# Patient Record
Sex: MALE | Race: WHITE | Employment: OTHER | URBAN - METROPOLITAN AREA
[De-identification: names, ages, dates, MRNs, and addresses within clinical notes are randomized per-mention and may not be internally consistent; named-entity substitution may affect disease eponyms.]

---

## 2017-05-08 ENCOUNTER — OFFICE VISIT (OUTPATIENT)
Dept: FAMILY MEDICINE CLINIC | Age: 82
End: 2017-05-08

## 2017-05-08 VITALS
DIASTOLIC BLOOD PRESSURE: 70 MMHG | SYSTOLIC BLOOD PRESSURE: 128 MMHG | BODY MASS INDEX: 34.24 KG/M2 | WEIGHT: 249 LBS | HEART RATE: 56 BPM

## 2017-05-08 DIAGNOSIS — I10 ESSENTIAL HYPERTENSION: Primary | ICD-10-CM

## 2017-05-08 DIAGNOSIS — E78.2 MIXED HYPERLIPIDEMIA: ICD-10-CM

## 2017-05-08 DIAGNOSIS — L30.9 DERMATITIS: ICD-10-CM

## 2017-05-08 PROCEDURE — 4040F PNEUMOC VAC/ADMIN/RCVD: CPT | Performed by: FAMILY MEDICINE

## 2017-05-08 PROCEDURE — G8419 CALC BMI OUT NRM PARAM NOF/U: HCPCS | Performed by: FAMILY MEDICINE

## 2017-05-08 PROCEDURE — 99213 OFFICE O/P EST LOW 20 MIN: CPT | Performed by: FAMILY MEDICINE

## 2017-05-08 PROCEDURE — 1123F ACP DISCUSS/DSCN MKR DOCD: CPT | Performed by: FAMILY MEDICINE

## 2017-05-08 PROCEDURE — 1036F TOBACCO NON-USER: CPT | Performed by: FAMILY MEDICINE

## 2017-05-08 PROCEDURE — G8427 DOCREV CUR MEDS BY ELIG CLIN: HCPCS | Performed by: FAMILY MEDICINE

## 2017-05-08 RX ORDER — LISINOPRIL 10 MG/1
10 TABLET ORAL 2 TIMES DAILY
Qty: 180 TABLET | Refills: 1 | Status: SHIPPED | OUTPATIENT
Start: 2017-05-08 | End: 2018-01-10 | Stop reason: SDUPTHER

## 2017-05-15 ENCOUNTER — HOSPITAL ENCOUNTER (OUTPATIENT)
Dept: WOUND CARE | Age: 82
Discharge: HOME | End: 2017-05-15
Attending: SPECIALIST | Admitting: SPECIALIST

## 2017-05-15 VITALS
WEIGHT: 249 LBS | SYSTOLIC BLOOD PRESSURE: 160 MMHG | BODY MASS INDEX: 33.72 KG/M2 | HEIGHT: 72 IN | TEMPERATURE: 98.6 F | RESPIRATION RATE: 16 BRPM | DIASTOLIC BLOOD PRESSURE: 84 MMHG | HEART RATE: 56 BPM

## 2017-05-15 DIAGNOSIS — I73.9 PERIPHERAL VASCULAR DISEASE, UNSPECIFIED (HCC): Primary | ICD-10-CM

## 2017-05-15 DIAGNOSIS — I87.2 CHRONIC VENOUS INSUFFICIENCY: ICD-10-CM

## 2017-05-15 DIAGNOSIS — I73.9 ARTERIAL INSUFFICIENCY OF LOWER EXTREMITY (HCC): ICD-10-CM

## 2017-05-15 PROCEDURE — 99203 OFFICE O/P NEW LOW 30 MIN: CPT | Performed by: SPECIALIST

## 2017-05-16 ENCOUNTER — HOSPITAL ENCOUNTER (OUTPATIENT)
Dept: VASCULAR LAB | Age: 82
Discharge: HOME | End: 2017-05-16
Attending: SPECIALIST | Admitting: SPECIALIST

## 2017-05-16 DIAGNOSIS — I73.9 PERIPHERAL VASCULAR DISEASE (HCC): ICD-10-CM

## 2017-05-18 ENCOUNTER — HOSPITAL ENCOUNTER (OUTPATIENT)
Dept: VASCULAR LAB | Age: 82
Discharge: HOME | End: 2017-05-18
Attending: SPECIALIST | Admitting: SPECIALIST

## 2017-05-18 DIAGNOSIS — I73.9 PERIPHERAL VASCULAR DISEASE (HCC): ICD-10-CM

## 2017-05-22 ENCOUNTER — HOSPITAL ENCOUNTER (OUTPATIENT)
Dept: WOUND CARE | Age: 82
Discharge: HOME | End: 2017-05-22
Attending: SPECIALIST | Admitting: SPECIALIST

## 2017-05-22 VITALS — RESPIRATION RATE: 16 BRPM | DIASTOLIC BLOOD PRESSURE: 78 MMHG | SYSTOLIC BLOOD PRESSURE: 175 MMHG | TEMPERATURE: 98.2 F

## 2017-05-22 DIAGNOSIS — I73.9 PERIPHERAL VASCULAR DISEASE, UNSPECIFIED (HCC): Primary | ICD-10-CM

## 2017-05-22 DIAGNOSIS — I87.2 CHRONIC VENOUS INSUFFICIENCY: ICD-10-CM

## 2017-05-22 PROCEDURE — 29581 APPL MULTLAYER CMPRN SYS LEG: CPT | Performed by: SPECIALIST

## 2017-05-26 ENCOUNTER — HOSPITAL ENCOUNTER (OUTPATIENT)
Dept: WOUND CARE | Age: 82
Discharge: HOME | End: 2017-05-26
Attending: SPECIALIST | Admitting: SPECIALIST

## 2017-05-26 VITALS
RESPIRATION RATE: 16 BRPM | SYSTOLIC BLOOD PRESSURE: 190 MMHG | TEMPERATURE: 97.7 F | DIASTOLIC BLOOD PRESSURE: 79 MMHG | HEART RATE: 58 BPM

## 2017-05-31 ENCOUNTER — HOSPITAL ENCOUNTER (OUTPATIENT)
Dept: WOUND CARE | Age: 82
Discharge: HOME | End: 2017-05-31
Attending: SPECIALIST | Admitting: SPECIALIST

## 2017-05-31 VITALS
TEMPERATURE: 98.2 F | DIASTOLIC BLOOD PRESSURE: 78 MMHG | HEART RATE: 55 BPM | SYSTOLIC BLOOD PRESSURE: 188 MMHG | RESPIRATION RATE: 18 BRPM

## 2017-05-31 DIAGNOSIS — I73.9 ARTERIAL INSUFFICIENCY OF LOWER EXTREMITY (HCC): ICD-10-CM

## 2017-05-31 DIAGNOSIS — I87.2 CHRONIC VENOUS INSUFFICIENCY: Primary | ICD-10-CM

## 2017-05-31 DIAGNOSIS — I73.9 PERIPHERAL VASCULAR DISEASE, UNSPECIFIED (HCC): ICD-10-CM

## 2017-05-31 PROCEDURE — 99212 OFFICE O/P EST SF 10 MIN: CPT | Performed by: SPECIALIST

## 2017-06-07 ENCOUNTER — HOSPITAL ENCOUNTER (OUTPATIENT)
Dept: WOUND CARE | Age: 82
Discharge: HOME | End: 2017-06-07
Attending: SPECIALIST | Admitting: SPECIALIST

## 2017-06-07 VITALS
HEART RATE: 68 BPM | DIASTOLIC BLOOD PRESSURE: 80 MMHG | RESPIRATION RATE: 18 BRPM | TEMPERATURE: 97.7 F | SYSTOLIC BLOOD PRESSURE: 183 MMHG

## 2017-06-07 DIAGNOSIS — I73.9 PERIPHERAL VASCULAR DISEASE, UNSPECIFIED (HCC): Primary | ICD-10-CM

## 2017-06-07 DIAGNOSIS — I87.2 CHRONIC VENOUS INSUFFICIENCY: ICD-10-CM

## 2017-06-07 PROCEDURE — 99212 OFFICE O/P EST SF 10 MIN: CPT | Performed by: SPECIALIST

## 2017-10-23 ENCOUNTER — NURSE ONLY (OUTPATIENT)
Dept: FAMILY MEDICINE CLINIC | Age: 82
End: 2017-10-23

## 2017-10-23 DIAGNOSIS — Z23 NEEDS FLU SHOT: Primary | ICD-10-CM

## 2017-10-23 PROCEDURE — 90662 IIV NO PRSV INCREASED AG IM: CPT | Performed by: FAMILY MEDICINE

## 2017-10-23 PROCEDURE — G0008 ADMIN INFLUENZA VIRUS VAC: HCPCS | Performed by: FAMILY MEDICINE

## 2017-11-09 ENCOUNTER — OFFICE VISIT (OUTPATIENT)
Dept: FAMILY MEDICINE CLINIC | Age: 82
End: 2017-11-09

## 2017-11-09 VITALS
OXYGEN SATURATION: 98 % | WEIGHT: 249 LBS | DIASTOLIC BLOOD PRESSURE: 76 MMHG | HEIGHT: 71 IN | HEART RATE: 52 BPM | SYSTOLIC BLOOD PRESSURE: 132 MMHG | BODY MASS INDEX: 34.86 KG/M2 | RESPIRATION RATE: 16 BRPM

## 2017-11-09 DIAGNOSIS — I87.2 CHRONIC VENOUS INSUFFICIENCY: ICD-10-CM

## 2017-11-09 DIAGNOSIS — I73.9 PERIPHERAL VASCULAR DISEASE (HCC): ICD-10-CM

## 2017-11-09 DIAGNOSIS — I10 ESSENTIAL HYPERTENSION: Primary | ICD-10-CM

## 2017-11-09 DIAGNOSIS — E78.2 MIXED HYPERLIPIDEMIA: ICD-10-CM

## 2017-11-09 PROCEDURE — G8417 CALC BMI ABV UP PARAM F/U: HCPCS | Performed by: FAMILY MEDICINE

## 2017-11-09 PROCEDURE — G8484 FLU IMMUNIZE NO ADMIN: HCPCS | Performed by: FAMILY MEDICINE

## 2017-11-09 PROCEDURE — 4040F PNEUMOC VAC/ADMIN/RCVD: CPT | Performed by: FAMILY MEDICINE

## 2017-11-09 PROCEDURE — G8427 DOCREV CUR MEDS BY ELIG CLIN: HCPCS | Performed by: FAMILY MEDICINE

## 2017-11-09 PROCEDURE — 1123F ACP DISCUSS/DSCN MKR DOCD: CPT | Performed by: FAMILY MEDICINE

## 2017-11-09 PROCEDURE — 99213 OFFICE O/P EST LOW 20 MIN: CPT | Performed by: FAMILY MEDICINE

## 2017-11-09 PROCEDURE — 1036F TOBACCO NON-USER: CPT | Performed by: FAMILY MEDICINE

## 2017-11-09 ASSESSMENT — PATIENT HEALTH QUESTIONNAIRE - PHQ9
2. FEELING DOWN, DEPRESSED OR HOPELESS: 0
1. LITTLE INTEREST OR PLEASURE IN DOING THINGS: 0
SUM OF ALL RESPONSES TO PHQ QUESTIONS 1-9: 0
SUM OF ALL RESPONSES TO PHQ9 QUESTIONS 1 & 2: 0

## 2017-11-09 NOTE — PROGRESS NOTES
Subjective:      Patient ID: Kishor Quesada is a 80 y.o. male. HPI  Hypertension: Patient here for follow-up of elevated blood pressure. He is not exercising and is adherent to low salt diet. Blood pressure is well controlled at home. Cardiac symptoms none. Patient denies chest pain, chest pressure/discomfort, claudication, dyspnea, exertional chest pressure/discomfort, fatigue, irregular heart beat, lower extremity edema, near-syncope, orthopnea, palpitations, paroxysmal nocturnal dyspnea, syncope and tachypnea. Cardiovascular risk factors: advanced age (older than 54 for men, 72 for women), hypertension, male gender, obesity (BMI >= 30 kg/m2) and sedentary lifestyle. Use of agents associated with hypertension: none. History of target organ damage: none. Pt with history of polio on the right was seen by wound care clinic, his leg is a lot better now, he is dicharged from their care to continue using comporession hoses, using cream was given doing better no concern    Review of Systems   Constitutional: Negative. HENT: Negative. Eyes: Negative. Respiratory: Negative. Cardiovascular: Negative. Gastrointestinal: Negative. All other systems reviewed and are negative. Objective:   Physical Exam   Vitals reviewed. Constitutional: He is oriented to person, place, and time. He appears well-developed and well-nourished. HENT:   Mouth/Throat: Oropharynx is clear and moist.   Eyes: Conjunctivae are normal. No scleral icterus. Neck: Normal range of motion. Neck supple. No JVD present. Carotid bruit is not present. No thyromegaly present. Cardiovascular: Normal rate, regular rhythm, normal heart sounds and intact distal pulses. No murmur heard. Pulmonary/Chest: Effort normal and breath sounds normal. No respiratory distress. He has no wheezes. He has no rales. He exhibits no tenderness. Abdominal: Soft. Bowel sounds are normal. He exhibits no distension and no mass.  There is no

## 2017-11-10 ENCOUNTER — HOSPITAL ENCOUNTER (OUTPATIENT)
Dept: GENERAL RADIOLOGY | Age: 82
Discharge: HOME | End: 2017-11-10

## 2017-11-10 LAB
ALBUMIN SERPL-MCNC: 4 G/DL (ref 3.4–5)
ALP BLD-CCNC: 80 U/L (ref 40–129)
ALT SERPL-CCNC: 23 U/L (ref 10–40)
ANION GAP SERPL CALCULATED.3IONS-SCNC: 16 MMOL/L (ref 3–16)
AST SERPL-CCNC: 26 U/L (ref 15–37)
BILIRUB SERPL-MCNC: 1.2 MG/DL (ref 0–1)
BILIRUBIN DIRECT: 0.3 MG/DL (ref 0–0.3)
BILIRUBIN, INDIRECT: 0.9 MG/DL (ref 0–1)
BUN BLDV-MCNC: 16 MG/DL (ref 7–20)
CALCIUM SERPL-MCNC: 9.3 MG/DL (ref 8.3–10.6)
CHLORIDE BLD-SCNC: 104 MMOL/L (ref 99–110)
CHOLESTEROL, FASTING: 177 MG/DL (ref 0–199)
CO2: 23 MMOL/L (ref 21–32)
CREAT SERPL-MCNC: 0.8 MG/DL (ref 0.8–1.3)
GFR AFRICAN AMERICAN: >60
GFR NON-AFRICAN AMERICAN: >60
GLUCOSE FASTING: 84 MG/DL (ref 70–99)
HCT VFR BLD CALC: 51.7 % (ref 40.5–52.5)
HDLC SERPL-MCNC: 35 MG/DL (ref 40–60)
HEMOGLOBIN: 17.6 G/DL (ref 13.5–17.5)
LDL CHOLESTEROL CALCULATED: 110 MG/DL
MCH RBC QN AUTO: 31.4 PG (ref 26–34)
MCHC RBC AUTO-ENTMCNC: 34 G/DL (ref 31–36)
MCV RBC AUTO: 92.4 FL (ref 80–100)
PDW BLD-RTO: 13.4 % (ref 12.4–15.4)
PLATELET # BLD: 135 K/UL (ref 135–450)
PMV BLD AUTO: 8.7 FL (ref 5–10.5)
POTASSIUM SERPL-SCNC: 4.4 MMOL/L (ref 3.5–5.1)
RBC # BLD: 5.59 M/UL (ref 4.2–5.9)
SODIUM BLD-SCNC: 143 MMOL/L (ref 136–145)
TOTAL PROTEIN: 6.2 G/DL (ref 6.4–8.2)
TRIGLYCERIDE, FASTING: 160 MG/DL (ref 0–150)
TSH SERPL DL<=0.05 MIU/L-ACNC: 2.88 UIU/ML (ref 0.27–4.2)
VLDLC SERPL CALC-MCNC: 32 MG/DL
WBC # BLD: 5.8 K/UL (ref 4–11)

## 2018-01-10 DIAGNOSIS — I10 ESSENTIAL HYPERTENSION: ICD-10-CM

## 2018-01-10 RX ORDER — LISINOPRIL 10 MG/1
10 TABLET ORAL 2 TIMES DAILY
Qty: 180 TABLET | Refills: 1 | Status: SHIPPED | OUTPATIENT
Start: 2018-01-10 | End: 2020-01-01 | Stop reason: SDUPTHER

## 2018-12-21 ENCOUNTER — NURSE ONLY (OUTPATIENT)
Dept: FAMILY MEDICINE CLINIC | Age: 83
End: 2018-12-21
Payer: MEDICARE

## 2018-12-21 DIAGNOSIS — Z23 NEED FOR INFLUENZA VACCINATION: Primary | ICD-10-CM

## 2018-12-21 PROCEDURE — 90662 IIV NO PRSV INCREASED AG IM: CPT | Performed by: FAMILY MEDICINE

## 2018-12-21 PROCEDURE — G0008 ADMIN INFLUENZA VIRUS VAC: HCPCS | Performed by: FAMILY MEDICINE

## 2019-10-11 ENCOUNTER — NURSE ONLY (OUTPATIENT)
Dept: PRIMARY CARE CLINIC | Age: 84
End: 2019-10-11
Payer: MEDICARE

## 2019-10-11 PROCEDURE — 90653 IIV ADJUVANT VACCINE IM: CPT | Performed by: FAMILY MEDICINE

## 2019-10-11 PROCEDURE — G0008 ADMIN INFLUENZA VIRUS VAC: HCPCS | Performed by: FAMILY MEDICINE

## 2020-01-01 ENCOUNTER — OFFICE VISIT (OUTPATIENT)
Dept: PRIMARY CARE CLINIC | Age: 85
End: 2020-01-01
Payer: MEDICARE

## 2020-01-01 ENCOUNTER — HOSPITAL ENCOUNTER (OUTPATIENT)
Age: 85
Discharge: HOME | End: 2020-10-12
Payer: MEDICARE

## 2020-01-01 ENCOUNTER — HOSPITAL ENCOUNTER (OUTPATIENT)
Dept: WOUND CARE | Age: 85
Discharge: HOME | End: 2020-10-19
Payer: MEDICARE

## 2020-01-01 ENCOUNTER — HOSPITAL ENCOUNTER (OUTPATIENT)
Dept: WOUND CARE | Age: 85
Discharge: HOME | End: 2020-10-26
Payer: MEDICARE

## 2020-01-01 ENCOUNTER — NURSE ONLY (OUTPATIENT)
Dept: PRIMARY CARE CLINIC | Age: 85
End: 2020-01-01
Payer: MEDICARE

## 2020-01-01 VITALS
TEMPERATURE: 99.7 F | HEART RATE: 87 BPM | DIASTOLIC BLOOD PRESSURE: 80 MMHG | SYSTOLIC BLOOD PRESSURE: 160 MMHG | WEIGHT: 224.4 LBS | RESPIRATION RATE: 14 BRPM | OXYGEN SATURATION: 98 % | BODY MASS INDEX: 31.3 KG/M2

## 2020-01-01 VITALS
BODY MASS INDEX: 31.46 KG/M2 | DIASTOLIC BLOOD PRESSURE: 84 MMHG | WEIGHT: 225.6 LBS | HEART RATE: 84 BPM | OXYGEN SATURATION: 98 % | TEMPERATURE: 97 F | SYSTOLIC BLOOD PRESSURE: 152 MMHG | RESPIRATION RATE: 14 BRPM

## 2020-01-01 VITALS
HEIGHT: 71 IN | TEMPERATURE: 97.7 F | BODY MASS INDEX: 32.31 KG/M2 | WEIGHT: 230.82 LBS | RESPIRATION RATE: 16 BRPM | HEART RATE: 73 BPM | DIASTOLIC BLOOD PRESSURE: 88 MMHG | SYSTOLIC BLOOD PRESSURE: 148 MMHG

## 2020-01-01 VITALS
DIASTOLIC BLOOD PRESSURE: 64 MMHG | BODY MASS INDEX: 33.22 KG/M2 | HEART RATE: 55 BPM | OXYGEN SATURATION: 98 % | TEMPERATURE: 99.3 F | SYSTOLIC BLOOD PRESSURE: 154 MMHG | RESPIRATION RATE: 14 BRPM | WEIGHT: 238.2 LBS

## 2020-01-01 VITALS
DIASTOLIC BLOOD PRESSURE: 65 MMHG | SYSTOLIC BLOOD PRESSURE: 170 MMHG | TEMPERATURE: 97.3 F | HEART RATE: 54 BPM | RESPIRATION RATE: 16 BRPM

## 2020-01-01 DIAGNOSIS — I10 ESSENTIAL HYPERTENSION: ICD-10-CM

## 2020-01-01 LAB
ALBUMIN SERPL-MCNC: 3.8 G/DL (ref 3.4–5)
ALP BLD-CCNC: 103 U/L (ref 40–129)
ALT SERPL-CCNC: 17 U/L (ref 10–40)
ANION GAP SERPL CALCULATED.3IONS-SCNC: 13 MMOL/L (ref 3–16)
ANION GAP SERPL CALCULATED.3IONS-SCNC: 14 MMOL/L (ref 3–16)
ANION GAP SERPL CALCULATED.3IONS-SCNC: 15 MMOL/L (ref 3–16)
AST SERPL-CCNC: 27 U/L (ref 15–37)
BILIRUB SERPL-MCNC: 1.5 MG/DL (ref 0–1)
BILIRUBIN DIRECT: 0.4 MG/DL (ref 0–0.3)
BILIRUBIN, INDIRECT: 1.1 MG/DL (ref 0–1)
BUN BLDV-MCNC: 10 MG/DL (ref 7–20)
BUN BLDV-MCNC: 15 MG/DL (ref 7–20)
BUN BLDV-MCNC: 19 MG/DL (ref 7–20)
CALCIUM SERPL-MCNC: 9.3 MG/DL (ref 8.3–10.6)
CALCIUM SERPL-MCNC: 9.4 MG/DL (ref 8.3–10.6)
CALCIUM SERPL-MCNC: 9.6 MG/DL (ref 8.3–10.6)
CHLORIDE BLD-SCNC: 105 MMOL/L (ref 99–110)
CHLORIDE BLD-SCNC: 106 MMOL/L (ref 99–110)
CHLORIDE BLD-SCNC: 107 MMOL/L (ref 99–110)
CHOLESTEROL, TOTAL: 154 MG/DL (ref 0–199)
CO2: 23 MMOL/L (ref 21–32)
CO2: 25 MMOL/L (ref 21–32)
CO2: 26 MMOL/L (ref 21–32)
CREAT SERPL-MCNC: 0.8 MG/DL (ref 0.8–1.3)
CREAT SERPL-MCNC: 0.8 MG/DL (ref 0.8–1.3)
CREAT SERPL-MCNC: 0.9 MG/DL (ref 0.8–1.3)
GFR AFRICAN AMERICAN: >60
GFR NON-AFRICAN AMERICAN: >60
GLUCOSE BLD-MCNC: 147 MG/DL (ref 70–99)
GLUCOSE BLD-MCNC: 62 MG/DL (ref 70–99)
GLUCOSE BLD-MCNC: 93 MG/DL (ref 70–99)
HCT VFR BLD CALC: 47.8 % (ref 40.5–52.5)
HCT VFR BLD CALC: 48.6 % (ref 40.5–52.5)
HDLC SERPL-MCNC: 38 MG/DL (ref 40–60)
HEMOGLOBIN: 15.9 G/DL (ref 13.5–17.5)
HEMOGLOBIN: 16.2 G/DL (ref 13.5–17.5)
LDL CHOLESTEROL CALCULATED: 93 MG/DL
MCH RBC QN AUTO: 30.6 PG (ref 26–34)
MCH RBC QN AUTO: 30.8 PG (ref 26–34)
MCHC RBC AUTO-ENTMCNC: 33.2 G/DL (ref 31–36)
MCHC RBC AUTO-ENTMCNC: 33.4 G/DL (ref 31–36)
MCV RBC AUTO: 92.3 FL (ref 80–100)
MCV RBC AUTO: 92.3 FL (ref 80–100)
PDW BLD-RTO: 13.9 % (ref 12.4–15.4)
PDW BLD-RTO: 14.4 % (ref 12.4–15.4)
PLATELET # BLD: 141 K/UL (ref 135–450)
PLATELET # BLD: 151 K/UL (ref 135–450)
PMV BLD AUTO: 8.9 FL (ref 5–10.5)
PMV BLD AUTO: 9.1 FL (ref 5–10.5)
POTASSIUM SERPL-SCNC: 3.9 MMOL/L (ref 3.5–5.1)
POTASSIUM SERPL-SCNC: 4.9 MMOL/L (ref 3.5–5.1)
POTASSIUM SERPL-SCNC: 5.9 MMOL/L (ref 3.5–5.1)
RBC # BLD: 5.18 M/UL (ref 4.2–5.9)
RBC # BLD: 5.27 M/UL (ref 4.2–5.9)
SODIUM BLD-SCNC: 142 MMOL/L (ref 136–145)
SODIUM BLD-SCNC: 146 MMOL/L (ref 136–145)
SODIUM BLD-SCNC: 146 MMOL/L (ref 136–145)
TOTAL PROTEIN: 5.9 G/DL (ref 6.4–8.2)
TRIGL SERPL-MCNC: 116 MG/DL (ref 0–150)
TSH SERPL DL<=0.05 MIU/L-ACNC: 3.26 UIU/ML (ref 0.27–4.2)
VLDLC SERPL CALC-MCNC: 23 MG/DL
WBC # BLD: 5.8 K/UL (ref 4–11)
WBC # BLD: 5.9 K/UL (ref 4–11)

## 2020-01-01 PROCEDURE — 84443 ASSAY THYROID STIM HORMONE: CPT

## 2020-01-01 PROCEDURE — 4040F PNEUMOC VAC/ADMIN/RCVD: CPT | Performed by: FAMILY MEDICINE

## 2020-01-01 PROCEDURE — 99213 OFFICE O/P EST LOW 20 MIN: CPT

## 2020-01-01 PROCEDURE — 1123F ACP DISCUSS/DSCN MKR DOCD: CPT | Performed by: FAMILY MEDICINE

## 2020-01-01 PROCEDURE — 99214 OFFICE O/P EST MOD 30 MIN: CPT | Performed by: FAMILY MEDICINE

## 2020-01-01 PROCEDURE — G8427 DOCREV CUR MEDS BY ELIG CLIN: HCPCS | Performed by: FAMILY MEDICINE

## 2020-01-01 PROCEDURE — 80061 LIPID PANEL: CPT

## 2020-01-01 PROCEDURE — 99212 OFFICE O/P EST SF 10 MIN: CPT | Performed by: SPECIALIST

## 2020-01-01 PROCEDURE — 29581 APPL MULTLAYER CMPRN SYS LEG: CPT

## 2020-01-01 PROCEDURE — 36415 COLL VENOUS BLD VENIPUNCTURE: CPT

## 2020-01-01 PROCEDURE — G0438 PPPS, INITIAL VISIT: HCPCS | Performed by: FAMILY MEDICINE

## 2020-01-01 PROCEDURE — 1036F TOBACCO NON-USER: CPT | Performed by: FAMILY MEDICINE

## 2020-01-01 PROCEDURE — G8417 CALC BMI ABV UP PARAM F/U: HCPCS | Performed by: FAMILY MEDICINE

## 2020-01-01 PROCEDURE — G0008 ADMIN INFLUENZA VIRUS VAC: HCPCS | Performed by: FAMILY MEDICINE

## 2020-01-01 PROCEDURE — 80048 BASIC METABOLIC PNL TOTAL CA: CPT

## 2020-01-01 PROCEDURE — 99212 OFFICE O/P EST SF 10 MIN: CPT

## 2020-01-01 PROCEDURE — 90694 VACC AIIV4 NO PRSRV 0.5ML IM: CPT | Performed by: FAMILY MEDICINE

## 2020-01-01 PROCEDURE — 99203 OFFICE O/P NEW LOW 30 MIN: CPT | Performed by: SURGERY

## 2020-01-01 PROCEDURE — G8484 FLU IMMUNIZE NO ADMIN: HCPCS | Performed by: FAMILY MEDICINE

## 2020-01-01 PROCEDURE — 85027 COMPLETE CBC AUTOMATED: CPT

## 2020-01-01 PROCEDURE — 6370000000 HC RX 637 (ALT 250 FOR IP): Performed by: SURGERY

## 2020-01-01 PROCEDURE — 80076 HEPATIC FUNCTION PANEL: CPT

## 2020-01-01 RX ORDER — LISINOPRIL 20 MG/1
20 TABLET ORAL 2 TIMES DAILY
Qty: 60 TABLET | Refills: 2 | Status: SHIPPED | OUTPATIENT
Start: 2020-01-01 | End: 2021-01-01 | Stop reason: SDUPTHER

## 2020-01-01 RX ORDER — FUROSEMIDE 40 MG/1
40 TABLET ORAL DAILY
Qty: 30 TABLET | Refills: 1 | Status: SHIPPED | OUTPATIENT
Start: 2020-01-01 | End: 2020-01-01

## 2020-01-01 RX ORDER — FUROSEMIDE 20 MG/1
20 TABLET ORAL DAILY
Qty: 90 TABLET | Refills: 0 | Status: SHIPPED | OUTPATIENT
Start: 2020-01-01 | End: 2020-01-01 | Stop reason: SDUPTHER

## 2020-01-01 RX ORDER — FUROSEMIDE 20 MG/1
20 TABLET ORAL DAILY
Qty: 30 TABLET | Refills: 0 | Status: SHIPPED | OUTPATIENT
Start: 2020-01-01 | End: 2020-01-01 | Stop reason: SDUPTHER

## 2020-01-01 RX ORDER — FUROSEMIDE 40 MG/1
40 TABLET ORAL DAILY
Qty: 90 TABLET | Refills: 1 | Status: SHIPPED | OUTPATIENT
Start: 2020-01-01 | End: 2021-01-01

## 2020-01-01 RX ORDER — FUROSEMIDE 20 MG/1
20 TABLET ORAL DAILY
Qty: 30 TABLET | Refills: 2 | Status: SHIPPED | OUTPATIENT
Start: 2020-01-01 | End: 2020-01-01

## 2020-01-01 RX ORDER — LISINOPRIL 10 MG/1
10 TABLET ORAL 2 TIMES DAILY
Qty: 90 TABLET | Refills: 1 | Status: SHIPPED | OUTPATIENT
Start: 2020-01-01 | End: 2020-01-01 | Stop reason: SDUPTHER

## 2020-01-01 RX ORDER — LIDOCAINE HYDROCHLORIDE 40 MG/ML
2.5 SOLUTION TOPICAL ONCE
Status: COMPLETED | OUTPATIENT
Start: 2020-01-01 | End: 2020-01-01

## 2020-01-01 RX ORDER — LISINOPRIL 20 MG/1
20 TABLET ORAL 2 TIMES DAILY
Qty: 60 TABLET | Refills: 2 | Status: SHIPPED | OUTPATIENT
Start: 2020-01-01 | End: 2020-01-01 | Stop reason: SDUPTHER

## 2020-01-01 RX ADMIN — LIDOCAINE HYDROCHLORIDE 2.5 ML: 40 SOLUTION TOPICAL at 13:47

## 2020-01-01 SDOH — ECONOMIC STABILITY: FOOD INSECURITY: WITHIN THE PAST 12 MONTHS, YOU WORRIED THAT YOUR FOOD WOULD RUN OUT BEFORE YOU GOT MONEY TO BUY MORE.: NEVER TRUE

## 2020-01-01 SDOH — ECONOMIC STABILITY: TRANSPORTATION INSECURITY
IN THE PAST 12 MONTHS, HAS THE LACK OF TRANSPORTATION KEPT YOU FROM MEDICAL APPOINTMENTS OR FROM GETTING MEDICATIONS?: NO

## 2020-01-01 SDOH — ECONOMIC STABILITY: TRANSPORTATION INSECURITY
IN THE PAST 12 MONTHS, HAS LACK OF TRANSPORTATION KEPT YOU FROM MEETINGS, WORK, OR FROM GETTING THINGS NEEDED FOR DAILY LIVING?: NO

## 2020-01-01 SDOH — ECONOMIC STABILITY: INCOME INSECURITY: HOW HARD IS IT FOR YOU TO PAY FOR THE VERY BASICS LIKE FOOD, HOUSING, MEDICAL CARE, AND HEATING?: NOT HARD AT ALL

## 2020-01-01 SDOH — ECONOMIC STABILITY: FOOD INSECURITY: WITHIN THE PAST 12 MONTHS, THE FOOD YOU BOUGHT JUST DIDN'T LAST AND YOU DIDN'T HAVE MONEY TO GET MORE.: NEVER TRUE

## 2020-01-01 ASSESSMENT — ENCOUNTER SYMPTOMS
GASTROINTESTINAL NEGATIVE: 1
EYES NEGATIVE: 1
RESPIRATORY NEGATIVE: 1
EYES NEGATIVE: 1
RESPIRATORY NEGATIVE: 1
GASTROINTESTINAL NEGATIVE: 1

## 2020-01-01 ASSESSMENT — PATIENT HEALTH QUESTIONNAIRE - PHQ9
SUM OF ALL RESPONSES TO PHQ9 QUESTIONS 1 & 2: 0
SUM OF ALL RESPONSES TO PHQ QUESTIONS 1-9: 0
SUM OF ALL RESPONSES TO PHQ QUESTIONS 1-9: 0
SUM OF ALL RESPONSES TO PHQ9 QUESTIONS 1 & 2: 0
1. LITTLE INTEREST OR PLEASURE IN DOING THINGS: 0
2. FEELING DOWN, DEPRESSED OR HOPELESS: 0
2. FEELING DOWN, DEPRESSED OR HOPELESS: 0
1. LITTLE INTEREST OR PLEASURE IN DOING THINGS: 0
SUM OF ALL RESPONSES TO PHQ QUESTIONS 1-9: 0
SUM OF ALL RESPONSES TO PHQ QUESTIONS 1-9: 0

## 2020-01-01 ASSESSMENT — LIFESTYLE VARIABLES
HOW OFTEN DO YOU HAVE A DRINK CONTAINING ALCOHOL: 0
AUDIT-C TOTAL SCORE: INCOMPLETE
HOW OFTEN DO YOU HAVE A DRINK CONTAINING ALCOHOL: NEVER
AUDIT TOTAL SCORE: INCOMPLETE

## 2020-06-18 ENCOUNTER — OFFICE VISIT (OUTPATIENT)
Dept: PRIMARY CARE CLINIC | Age: 85
End: 2020-06-18
Payer: MEDICARE

## 2020-06-18 VITALS — TEMPERATURE: 98.3 F | HEART RATE: 50 BPM | OXYGEN SATURATION: 96 %

## 2020-06-18 PROCEDURE — G8428 CUR MEDS NOT DOCUMENT: HCPCS | Performed by: FAMILY MEDICINE

## 2020-06-18 PROCEDURE — 99213 OFFICE O/P EST LOW 20 MIN: CPT | Performed by: FAMILY MEDICINE

## 2020-06-18 PROCEDURE — 1123F ACP DISCUSS/DSCN MKR DOCD: CPT | Performed by: FAMILY MEDICINE

## 2020-06-18 PROCEDURE — 4040F PNEUMOC VAC/ADMIN/RCVD: CPT | Performed by: FAMILY MEDICINE

## 2020-06-18 PROCEDURE — G8421 BMI NOT CALCULATED: HCPCS | Performed by: FAMILY MEDICINE

## 2020-06-18 PROCEDURE — 4004F PT TOBACCO SCREEN RCVD TLK: CPT | Performed by: FAMILY MEDICINE

## 2020-06-18 NOTE — PROGRESS NOTES
Uncertain Viral Respiratory Illness  [x] Possible COVID-19  [] Exposure COVID-19  [] Other    PLAN:    [x] Discharge home with written instructions for:  [] Flu management  [] Strep throat management  [] Possible COVID-19 exposure with self-quarantine   [x] Possible COVID-19 with isolation instructions and management of symptoms  [] Follow-up with primary care physician or emergency department if worsens  [] Referred to emergency department for evaluation    [x] Evaluation per physician/nurse practitioner in clinic      An  electronic signature was used to authenticate this note.      --Aamir Chan MA on 6/18/2020 at 10:21 AM

## 2020-06-18 NOTE — PATIENT INSTRUCTIONS
have a medical emergency and need to call 911, notify the dispatch personnel that you have, or are being evaluated for COVID-19. If possible, put on a facemask before emergency medical services arrive. Discontinuing home isolation  Patients with confirmed COVID-19 should remain under home isolation precautions until the risk of secondary transmission to others is thought to be low. The decision to discontinue home isolation precautions should be made on a case-by-case basis, in consultation with healthcare providers and state and local health departments. Thank you for enrolling in [address] Please follow the instructions below to securely access your online medical record. [employer] allows you to send messages to your doctor, view your test results, renew your prescriptions, schedule appointments, and more. How Do I Sign Up? 1. In your Internet browser, go to [email or web address]  2. Click on the Sign Up Now link in the Sign In box. You will see the New Member Sign Up page. 3. Enter your [employer] Access Code exactly as it appears below. You will not need to use this code after youve completed the sign-up process. If you do not sign up before the expiration date, you must request a new code. [employer] Access Code: [email or web address]  Expires: 8/2/2020 10:20 AM    4. Enter your Social Security Number ([id number]) and Date of Birth (mm/dd/yyyy) as indicated and click Submit. You will be taken to the next sign-up page. 5. Create a [employer] ID. This will be your [employer] login ID and cannot be changed, so think of one that is secure and easy to remember. 6. Create a [employer] password. You can change your password at any time. 7. Enter your Password Reset Question and Answer. This can be used at a later time if you forget your password. 8. Enter your e-mail address. You will receive e-mail notification when new information is available in [address] 9. Click Sign Up.  You can now view your

## 2020-06-20 LAB
SARS-COV-2: NOT DETECTED
SOURCE: NORMAL

## 2020-09-29 NOTE — PROGRESS NOTES
Vaccine Information Sheet, \"Influenza - Inactivated\"  given to Stephenie Villagomez, or parent/legal guardian of  Stephenie Villagomez and verbalized understanding. Patient responses:    Have you ever had a reaction to a flu vaccine? No  Are you able to eat eggs without adverse effects? Yes  Do you have any current illness? No  Have you ever had Guillian Hebron Syndrome? No    Immunizations Administered     Name Date Dose Route    Influenza, Quadv, adjuvanted, 65 yrs +, IM, PF (Fluad) 9/29/2020 0.5 mL Intramuscular    Site: Deltoid- Left    Lot: [id number]    NDC: 11915-020-95          Flu vaccine given per order. Please see immunization tab. Patient instructed to remain in clinic for 20 minutes after injection and was advised to report any adverse reaction to me immediately.

## 2020-10-08 PROBLEM — I73.9 ARTERIAL INSUFFICIENCY OF LOWER EXTREMITY (HCC): Status: RESOLVED | Noted: 2017-05-15 | Resolved: 2020-01-01

## 2020-10-08 PROBLEM — I73.9 PERIPHERAL VASCULAR DISEASE (HCC): Status: RESOLVED | Noted: 2017-05-15 | Resolved: 2020-01-01

## 2020-10-08 NOTE — LETTER
[address] [place] [address]  Phone: [phone]  Fax: [phone]    Enrrique Sadler MD        October 8, 2020     Patient: Maine Liriano   YOB: 1935   Date of Visit: 10/8/2020       To Whom It May Concern: It is my medical opinion that Maine Liriano requires a disability parking placard for the following reasons:    Peripheral vascular disease     Duration of need: 5 years    If you have any questions or concerns, please don't hesitate to call.     Sincerely,        Enrrique Sadler MD

## 2020-10-08 NOTE — PROGRESS NOTES
Medicare Annual Wellness Visit  Name: Robin Canales Date: 10/8/2020   MRN: <[id number]> Sex: Male   Age: 80 y.o. Ethnicity: Non-/Non    : 1935 Race: Ainsley Adrian is here for Medicare AWV    Screenings for behavioral, psychosocial and functional/safety risks, and cognitive dysfunction are all negative except as indicated below. These results, as well as other patient data from the [address] form, are documented in Flowsheets linked to this Encounter. Allergies   Allergen Reactions    Penicillins        Prior to Visit Medications    Medication Sig Taking? Authorizing Provider   aspirin 81 MG EC tablet Take 81 mg by mouth daily. Yes Historical Provider, MD   Flaxseed Linseed, (FLAX SEED OIL) 1000 MG CAPS Take  by mouth.  Yes Historical Provider, MD   lisinopril (PRINIVIL;ZESTRIL) 10 MG tablet Take 1 tablet by mouth 2 times daily  Patient not taking: Reported on 10/8/2020  Erick Vale MD       Past Medical History:   Diagnosis Date    Bradycardia     Dr Alvin Topete Hypertension     Polio age 2    Status post eye surgery 12-3-07    lens implant       Past Surgical History:   Procedure Laterality Date    CARDIOVASCULAR STRESS TEST      Dr Jay Ours  2009    Dr Abel Chi    COLONOSCOPY  2012   Kelsie Rodgers       Family History   Problem Relation Age of Onset    Breast Cancer Mother     Cancer Mother         breast ca,    Other Father         bone cancer    Cancer Father         prostate ca,  age 61       CareTeam (Including outside providers/suppliers regularly involved in providing care):   Patient Care Team:  Erick Vale MD as PCP - General (Family Medicine)  Erick Vale MD as PCP - [facility] Empaneled Provider    Wt Readings from Last 3 Encounters:   10/08/20 238 lb 3.2 oz (108 kg)   17 249 lb (112.9 kg)   05/15/17 249 lb (112.9 kg)     Vitals:    10/08/20 1319 10/08/20 1325 Virus Vaccine 11/10/2010    Influenza, High Dose (Fluzone 65 yrs and older) 09/20/2011, 10/01/2012, 09/30/2013, 09/30/2014, 11/06/2015, 11/17/2016, 10/23/2017, 12/21/2018    Influenza, Quadv, adjuvanted, 65 yrs +, IM, PF (Fluad) 09/29/2020    Influenza, Triv, inactivated, subunit, adjuvanted, IM (Fluad 65 yrs and older) 10/11/2019    Pneumococcal Conjugate 13-valent ([id number]) 11/06/2015    Pneumococcal Polysaccharide ([id number]) 01/10/2008    Tdap (Boostrix, Adacel) 03/30/2012    Zoster Live (Zostavax) 10/16/2015        Health Maintenance   Topic Date Due    Shingles Vaccine (2 of 3) 12/11/2015    Potassium monitoring  11/10/2018    Creatinine monitoring  11/10/2018    Annual Wellness Visit (AWV)  06/19/2019    DTaP/Tdap/Td vaccine (2 - Td) 03/30/2022    Flu vaccine  Completed    Pneumococcal 65+ years Vaccine  Completed    Hepatitis A vaccine  Aged Out    Hepatitis B vaccine  Aged Out    Hib vaccine  Aged Out    Meningococcal (ACWY) vaccine  Aged Out     Recommendations for [employer] Due: see orders and patient instructions/AVS.  . Recommended screening schedule for the next 5-10 years is provided to the patient in written form: see Patient Instructions/AVS.    There are no diagnoses linked to this encounter.     Resume medication  Symptom milligrams I added Lasix  Close follow-up  Referral to see Derm/wound care

## 2020-10-19 PROBLEM — L97.919 IDIOPATHIC CHRONIC VENOUS HYPERTENSION OF RIGHT LOWER EXTREMITY WITH ULCER (HCC): Status: ACTIVE | Noted: 2020-01-01

## 2020-10-19 PROBLEM — I87.311 IDIOPATHIC CHRONIC VENOUS HYPERTENSION OF RIGHT LOWER EXTREMITY WITH ULCER (HCC): Status: ACTIVE | Noted: 2020-01-01

## 2020-10-19 NOTE — PROGRESS NOTES
[facility]  Progress Note and Procedure Note      Sophie Meneses  MEDICAL RECORD NUMBER:  [id number]  AGE: 80 y.o. GENDER: male  : 1935  EPISODE DATE:  10/19/2020    Subjective:     Chief Complaint   Patient presents with    Wound Check     initial      HISTORY of PRESENT ILLNESS HPI   Sophie Meneses is a 80 y.o. male who presents today for wound/ulcer evaluation. History of Wound Context: pt was seen in [address] [place] [address],[address] in 2017for venous stasis ulcers, discharged with compression stockings. However, has no longer been wearing these per pt and wife, due to difficulty donning. Also, admits to not elevating legs. Has had wounds for several weeks now. Denies any CHF or other cardiac history.     Ulcer Identification:  Ulcer Type: venous  Contributing Factors: edema, venous stasis, decreased mobility and non-adherence        PAST MEDICAL HISTORY      Diagnosis Date    Bradycardia     Dr Margarita De Guzman Hyperlipidemia     Hypertension     Polio age 2    Status post eye surgery 12-3-07    lens implant     PAST SURGICAL HISTORY  Past Surgical History:   Procedure Laterality Date    CARDIOVASCULAR STRESS TEST      Dr Jackson Carbajal  2009    Dr Eladia Garrett    COLONOSCOPY  2012   [facility] HISTORY  Family History   Problem Relation Age of Onset    Breast Cancer Mother     Cancer Mother         breast ca,    Other Father         bone cancer    Cancer Father         prostate ca,  age 61     SOCIAL HISTORY  Social History     Tobacco Use    Smoking status: Never Smoker    Smokeless tobacco: Never Used   Substance Use Topics    Alcohol use: No    Drug use: Never     ALLERGIES  Allergies   Allergen Reactions    Penicillins      MEDICATIONS  Current Outpatient Medications on File Prior to Encounter   Medication Sig Dispense Refill    lisinopril (PRINIVIL;ZESTRIL) 10 MG tablet Take 1 tablet by mouth 2 times daily 90 tablet 1    furosemide (LASIX) 20 MG tablet Take 1 tablet by mouth daily FOR 10 DAYS 30 tablet 0    aspirin 81 MG EC tablet Take 81 mg by mouth daily.  Flaxseed, Linseed, (FLAX SEED OIL) 1000 MG CAPS Take  by mouth. No current facility-administered medications on file prior to encounter. REVIEW OF SYSTEMS  Pertinent positive and negative items are noted in the HPI. A comprehensive review of all other symptoms is otherwise negative. Last I8C if applicable: No results found for: LABA1C    Objective:      BP (!) 148/88   Pulse 73   Temp 97.7 °F (36.5 °C) (Skin)   Resp 16   Ht 5' 11\" (1.803 m)   Wt 230 lb 13.2 oz (104.7 kg)   BMI 32.19 kg/m²     Wt Readings from Last 3 Encounters:   10/19/20 230 lb 13.2 oz (104.7 kg)   10/08/20 238 lb 3.2 oz (108 kg)   11/09/17 249 lb (112.9 kg)       PHYSICAL EXAM    General Appearance: alert and oriented to person, place and time, well-developed and well-nourished, in no acute distress  Pulmonary/Chest: no audible wheezing. No increased WOB or use of accessory muscles. Extremities: BLE with chronic non-pitting edema. Diffuse skin changes to RLE--scaling, thickening nd discoloration of skin. Ulcerations as described in flow sheet. Vascular:  Bilateral DP/PT biphasic by doppler    Assessment:      Patient Active Problem List   Diagnosis Code    Colon polyps K63.5    HTN (hypertension) I10    Sinus bradycardia R00.1    Hyperlipidemia E78.5    Chronic venous insufficiency I87.2    Idiopathic chronic venous hypertension of right lower extremity with ulcer ([facility] [address] I87.311, L97.919     Procedure Note  Indications:  Based on my examination of this patient's wound(s)/ulcer(s) today, debridement is not required to promote healing and evaluate the wound base. Wound Measurements: Assessment of the wounds are pre debridement:  Wound 10/19/20 Leg Right; Anterior; Lower #1 cluster (Active)   Wound Image   10/19/20 1321   Wound Etiology Venous 10/19/20 1321   Wound Cleansed Cleansed with saline 10/19/20 1321   Wound Length (cm) 1 cm 10/19/20 1321   Wound Width (cm) 0.5 cm 10/19/20 1321   Wound Depth (cm) 0.1 cm 10/19/20 1321   Wound Surface Area (cm^2) 0.5 cm^2 10/19/20 1321   Wound Volume (cm^3) 0.05 cm^3 10/19/20 1321   Post-Procedure Length (cm) 1 cm 10/19/20 1355   Post-Procedure Width (cm) 0.5 cm 10/19/20 1355   Post-Procedure Depth (cm) 0.1 cm 10/19/20 1355   Post-Procedure Surface Area (cm^2) 0.5 cm^2 10/19/20 1355   Post-Procedure Volume (cm^3) 0.05 cm^3 10/19/20 1355   Distance Tunneling (cm) 0 cm 10/19/20 1321   Undermining Maxium Distance (cm) 0 10/19/20 1321   Wound Assessment Granulation tissue 10/19/20 1321   Odor None 10/19/20 1321   Kim-wound Assessment Dry/flaky;Edematous 10/19/20 1321   Margins Undefined edges 10/19/20 1321   Wound Thickness Description not for Pressure Injury Full thickness 10/19/20 1321   Number of days: 0          Plan:   3-layer compression wrap  Elevation of legs. Follow up in one week. Treatment Note please see attached Discharge Instructions    New Medication(s) at this visit:   New Prescriptions    No medications on file       Other orders at this visit:   Orders Placed This Encounter   Procedures    Wound care       Smoking Cessation: Counseling given: Not Answered      Written patient dismissal instructions given to patient and signed by patient or POA. Discharge [address] [facility] Physician Orders and Discharge Instructions  [facility] [address] [facility], [address]  Telephone: [phone] hands with soap and water prior to and after every dressing change. Wound Cleansing: (All wounds are cleaned with 0.9% saline during your wound care visit)   · Do not scrub or use excessive force. · With each dressing change, rinse wounds with 0.9% Saline. (May use wound wash or soft contact solution.  Both can be purchased at a local drug store). · If unable to obtain saline, may use a gentle soap and water. · Keep wounds dry in the shower unless otherwise instructed by the physician. · For wounds on lower legs, cast covers can be purchased at local drug stores, so that you may shower and keep the wound(s) dry. Vashe wound cleanser:  [] Instructions for Vashe Wash solution ONLY: Apply enough Vashe to soak a piece of gauze and place on wound bed for 5-10 minutes. DO NOT rinse after Vashe has been applied. Follow dressing application as instructed below. [] Vashe Wash applied during clinic visit. Kim wound Topical Treatments:  Do not apply lotions, creams, or ointments to the skin around the wound bed unless directed as followed:     [x] Apply around the wound: [x] moisturizing lotion [] Antifungal ointment [] No-Sting barrier film [] Zinc paste [] Other:       Dressings:           Wound Location: right lower leg wounds      [x] Apply Primary Dressing to wound:   [] Foam/Foam with Border [] Non-adherent  [] Alginate with Silver  [] Alginate [] Collagen  [x] Collagen with Silver [] Hydrocolloid [] Hydrofera Blue moistened with saline [] MediHoney Paste/Gel  [] Hydrogel   [] Endoform  [] Santyl covered with gauze moisten with saline  [] Betadine   [] Bactroban/Mupirocin   [] Polysporin/Neosporin  [] Saline/Vashe \"wet to dry\" gauze   [] Other:     Pack wound loosely with: [] Iodoform   [] Plain Packing  [] Other:      [x] Cover and Secure with:     [] Dry Gauze  [] ABD [] Cast padding [] Kerlix or Manjit rolled gauze  [] Super Absorbent     [] Coban  [] Ace Wrap [] Ace Wrap from forefoot to just below the knee [] Paper Tape [] Medipore Tape  [] Other:    Avoid contact of tape with skin if possible.     [x] Change dressing: [] Daily [] Every Other Day [] Once a week  [] Three times per week: [] Monday, Wednesday, Friday [] Tuesday, Thursday, Saturday  [x] Do Not Change Dressing [] Other:      Multilayer Compression Wrap:    Type: 3 Layer Compression Wrap   · Applied in Clinic to the :  Bilateral lower leg(s) on 10/19/2020  · Do not get leg(s) with wrap wet. · If wraps become too tight call the center or completely remove the wrap. · Elevate leg(s) above the level of the heart when sitting. · Avoid prolonged standing in one place. Dietary:  Important dietary reminders:  1. Increase Protein intake (i.e. Lean meats, fish, eggs, legumes, and yogurt)  2. No added salt  3. If diabetic, follow a diabetic diet and check glucose prior to meals or as instructed by your physician. If you are still having pain after you go home:   For wounds on lower legs or arms, elevate the affected limb.  Use over-the-counter medications you would normally use for pain as permitted by your primary care doctor.  For persistent pain not relieved by the above interventions, please call your primary care doctor. Return Appointment:   Return Appointment: With Dr. Ewing Back  in  [address] [place] [address])  o Schedule weekly until (Date):      [] Return Appointment for a Wound Assessment with a nurse on:     []DME/Wound Dressing Supplies ordered at this visit: []Yes []No  o Supplies Provided by [place] [address] [phone]. Please call them directly to reorder supplies when you run out.  o CONTINUE TO USE THE SUPPLIES YOU HAVE AVAILABLE. IT IS MOST IMPORTANT TO KEEP THE WOUND COVERED AT ALL TIMES. [x] Orders placed during your visit: No orders of the defined types were placed in this encounter. Your nurse  is:  Anjel Jaramillo     Electronically signed by Clarisa Vargas RN on 10/19/2020 at [address] [facility] Information: Should you experience any significant changes in your wound(s) or have questions about your wound care, please contact the [address] [place] [address] at [phone]. We are open from 8:00am - 4:30p Monday thru Friday except for Wednesdays which we are closed.  Please give us 24-48 hours to

## 2020-10-19 NOTE — PROGRESS NOTES
Multilayer Compression Wrap   (Not Unna) Below the Knee    NAME:  Deana Kaur 1452:  1935  MEDICAL RECORD NUMBER:  [id number]  DATE:  10/19/2020        Removed old Multilayer wrap if present and washed leg with mild soap/water.   Applied moisturizing agent to dry skin as needed.   Applied primary and secondary dressing as ordered     Applied multilayered dressing below the knee to Bilateral lower leg(s)  (3 layer compression wrap) per 's instructions.   Instructed patient/caregiver not to remove dressing and to keep it clean and dry.   Instructed patient/caregiver on complications to report to provider, such as pain, numbness in toes, heavy drainage, and slippage of dressing.   Instructed patient on purpose of compression dressing and on activity and exercise recommendations.     Applied per   Guidelines    Electronically signed by Jeremías Spears RN on 10/19/2020 at 2:32 PM

## 2020-10-26 NOTE — PROGRESS NOTES
[address] [facility]  Progress Note and Procedure Note      Ronak Caceres  AGE: 80 y.o. GENDER: male  : 1935  EPISODE DATE:  10/26/2020      Subjective:     Chief Complaint   Patient presents with    Wound Check     LOWER LEG         HISTORY of PRESENT ILLNESS HPI     Ronak Caceres is a 80 y.o. male who presents today for wound evaluation. History of Wound Context: Patient recently seen by Dr. Anirudh Bermeo for venous stasis ulcers.   He was wrapped in compression last week  Wound Pain Timing/Severity: none  Quality of pain: N/A  Severity:  0 / 10   Modifying Factors: None  Associated Signs/Symptoms: edema    Wound Identification:  Wound Type: venous  Contributing Factors: edema, venous stasis, decreased mobility and non-adherence        PAST MEDICAL HISTORY        Diagnosis Date    Bradycardia     Dr Ramirez Agudelo Hyperlipidemia     Hypertension     Polio age 2    Status post eye surgery 12-3-07    lens implant       PAST SURGICAL HISTORY    Past Surgical History:   Procedure Laterality Date    CARDIOVASCULAR STRESS TEST      Dr Rafael Matias  2009    Dr Rose Colón    COLONOSCOPY  2012   [facility] HISTORY    Family History   Problem Relation Age of Onset    Breast Cancer Mother     Cancer Mother         breast ca,    Other Father         bone cancer    Cancer Father         prostate ca,  age 61       SOCIAL HISTORY    Social History     Tobacco Use    Smoking status: Never Smoker    Smokeless tobacco: Never Used   Substance Use Topics    Alcohol use: No    Drug use: Never       ALLERGIES    Allergies   Allergen Reactions    Penicillins        MEDICATIONS    Current Outpatient Medications on File Prior to Encounter   Medication Sig Dispense Refill    lisinopril (PRINIVIL;ZESTRIL) 10 MG tablet Take 1 tablet by mouth 2 times daily 90 tablet 1    furosemide (LASIX) 20 MG tablet Take 1 tablet by mouth daily FOR 10 DAYS 30 tablet 0    aspirin 81 MG EC tablet Take 81 mg by mouth daily.  Flaxseed, Linseed, (FLAX SEED OIL) 1000 MG CAPS Take  by mouth. No current facility-administered medications on file prior to encounter. REVIEW OF SYSTEMS    Pertinent items are noted in HPI. Objective:      BP (!) 170/65   Pulse 54   Temp 97.3 °F (36.3 °C) (Temporal)   Resp 16     PHYSICAL EXAM    Extremities: no cyanosis, no clubbing and chronic nonpitting edema bilateral lower extremities. Complete epithelialization of partial-thickness wounds right lower extremity        Assessment:     1. Venous (peripheral) insufficiency          Wound Measurements:          Plan:     Treatment Note please see attached Discharge Instructions  Once again the patient was strongly advised to purchase and wear compression stockings  New Medication(s) at this visit:   Jerome Corona    by Does not apply route Strength 30-40mmHg  Style: Other velcro compression stockings  Extremity: bilateral  Donning aid recommended: No       Other orders at this visit: No orders of the defined types were placed in this encounter. Written patient dismissal instructions given to patient and signed by patient or POA. Discharge Instructions       DISCHARGE INSTRUCTIONS  [employer] [employer]    [address] [address] [facility]. Telephone: 21  [phone]    NAME:  Alberto Cooper  YOB: 1935  MEDICAL RECORD NUMBER:  [id number]  DATE:  10/26/2020      Congratulations! You have completed your treatment program!    To prevent Venous Wounds from recurring again:  · Elevate your legs at least parallel to the ground while sitting. · Wear your 30-40mmHG prescription support stockings everyday and remove at night while you sleep. · Replace your stockings every 3-6 months so that your legs continue to get the support they need.   · Inspect your legs and

## 2020-11-27 NOTE — TELEPHONE ENCOUNTER
Medication:   Requested Prescriptions     Pending Prescriptions Disp Refills    furosemide (LASIX) 20 MG tablet [Pharmacy Med Name: FUROSEMIDE 20MG TABLETS] 90 tablet      Sig: TAKE 1 TABLET BY MOUTH DAILY     Last Filled: 10.29.20    Last appt: 10/29/2020   Next appt: 12/10/2020    Last OARRS: No flowsheet data found.

## 2020-12-10 NOTE — TELEPHONE ENCOUNTER
Medication:   Requested Prescriptions     Pending Prescriptions Disp Refills    furosemide (LASIX) 40 MG tablet [Pharmacy Med Name: FUROSEMIDE 40MG TABLETS] 90 tablet      Sig: TAKE 1 TABLET BY MOUTH DAILY     Last Filled:  12/10/20    Last appt: 12/10/2020   Next appt: 2/4/2021    Last OARRS: No flowsheet data found.

## 2020-12-10 NOTE — PROGRESS NOTES
 Transportation needs     Medical: No     Non-medical: No   Tobacco Use    Smoking status: Never Smoker    Smokeless tobacco: Never Used   Substance and Sexual Activity    Alcohol use: No    Drug use: Never    Sexual activity: Not Currently     Partners: Female     Comment:    Lifestyle    Physical activity     Days per week: None     Minutes per session: None    Stress: None   Relationships    Social connections     Talks on phone: None     Gets together: None     Attends Buddhism service: None     Active member of club or organization: None     Attends meetings of clubs or organizations: None     Relationship status: None    Intimate partner violence     Fear of current or ex partner: None     Emotionally abused: None     Physically abused: None     Forced sexual activity: None   Other Topics Concern    None   Social History Narrative    None     Current Outpatient Medications   Medication Sig Dispense Refill    furosemide (LASIX) 20 MG tablet TAKE 1 TABLET BY MOUTH DAILY 90 tablet 0    lisinopril (PRINIVIL;ZESTRIL) 20 MG tablet Take 1 tablet by mouth 2 times daily 60 tablet 2    Compression Stockings MISC by Does not apply route Strength 30-40mmHg  Style: Other velcro compression stockings  Extremity: bilateral  Donning aid recommended: No 1 each 0    aspirin 81 MG EC tablet Take 81 mg by mouth daily.  Flaxseed, Linseed, (FLAX SEED OIL) 1000 MG CAPS Take  by mouth. No current facility-administered medications for this visit. Allergies   Allergen Reactions    Penicillins        Review of Systems   Constitutional: Negative. HENT: Negative. Eyes: Negative. Respiratory: Negative. Cardiovascular: Positive for leg swelling. Gastrointestinal: Negative. All other systems reviewed and are negative.       OBJECTIVE: BP (!) 160/80 (Site: Right Upper Arm, Position: Sitting, Cuff Size: Large Adult)   Pulse 87   Temp 99.7 °F (37.6 °C) (Temporal)   Resp 14   Wt 224 lb 6.4 oz (101.8 kg)   SpO2 98%   BMI 31.30 kg/m²     Physical Exam  Vitals signs reviewed. Constitutional:       Appearance: Normal appearance. He is well-developed. Eyes:      General: No scleral icterus. Conjunctiva/sclera: Conjunctivae normal.   Neck:      Musculoskeletal: Normal range of motion and neck supple. Thyroid: No thyromegaly. Vascular: No carotid bruit or JVD. Cardiovascular:      Rate and Rhythm: Normal rate and regular rhythm. Heart sounds: Normal heart sounds. No murmur. Pulmonary:      Effort: Pulmonary effort is normal. No respiratory distress. Breath sounds: Normal breath sounds. No wheezing or rales. Chest:      Chest wall: No tenderness. Abdominal:      General: Bowel sounds are normal. There is no distension. Palpations: Abdomen is soft. There is no mass. Tenderness: There is no abdominal tenderness. There is no guarding or rebound. Musculoskeletal: Normal range of motion. General: No tenderness. Left lower leg: Edema present. Legs:    Skin:     Findings: No rash. Neurological:      Mental Status: He is alert and oriented to person, place, and time. ASSESSMENT:     Diagnosis Orders   1. Essential hypertension  Basic Metabolic Panel    Basic Metabolic Panel   2.  Edema leg  DISCONTINUED: furosemide (LASIX) 40 MG tablet       PLAN:  See orders  Crease Lasix  Continue to monitor blood pressure at home  Skin care as he instructed  Check potassium

## 2021-01-01 ENCOUNTER — TELEPHONE (OUTPATIENT)
Dept: PRIMARY CARE CLINIC | Age: 86
End: 2021-01-01

## 2021-01-01 ENCOUNTER — OFFICE VISIT (OUTPATIENT)
Dept: PRIMARY CARE CLINIC | Age: 86
End: 2021-01-01
Payer: MEDICARE

## 2021-01-01 ENCOUNTER — TELEPHONE (OUTPATIENT)
Dept: PRIMARY CARE CLINIC | Age: 86
End: 2021-01-01
Payer: COMMERCIAL

## 2021-01-01 VITALS
OXYGEN SATURATION: 99 % | BODY MASS INDEX: 29.29 KG/M2 | TEMPERATURE: 96.3 F | DIASTOLIC BLOOD PRESSURE: 62 MMHG | HEART RATE: 50 BPM | WEIGHT: 210 LBS | SYSTOLIC BLOOD PRESSURE: 126 MMHG

## 2021-01-01 VITALS
RESPIRATION RATE: 14 BRPM | HEART RATE: 99 BPM | SYSTOLIC BLOOD PRESSURE: 150 MMHG | OXYGEN SATURATION: 99 % | WEIGHT: 213 LBS | DIASTOLIC BLOOD PRESSURE: 80 MMHG | BODY MASS INDEX: 29.71 KG/M2

## 2021-01-01 VITALS
OXYGEN SATURATION: 97 % | WEIGHT: 220 LBS | BODY MASS INDEX: 29.8 KG/M2 | RESPIRATION RATE: 12 BRPM | SYSTOLIC BLOOD PRESSURE: 124 MMHG | HEIGHT: 72 IN | DIASTOLIC BLOOD PRESSURE: 82 MMHG | HEART RATE: 87 BPM

## 2021-01-01 VITALS
DIASTOLIC BLOOD PRESSURE: 82 MMHG | HEART RATE: 57 BPM | OXYGEN SATURATION: 97 % | RESPIRATION RATE: 14 BRPM | BODY MASS INDEX: 29.68 KG/M2 | WEIGHT: 212.8 LBS | SYSTOLIC BLOOD PRESSURE: 138 MMHG

## 2021-01-01 DIAGNOSIS — R63.4 WEIGHT LOSS: ICD-10-CM

## 2021-01-01 DIAGNOSIS — L97.919 ULCER OF RIGHT LOWER EXTREMITY, UNSPECIFIED ULCER STAGE (HCC): ICD-10-CM

## 2021-01-01 DIAGNOSIS — I10 ESSENTIAL HYPERTENSION: ICD-10-CM

## 2021-01-01 DIAGNOSIS — I10 ESSENTIAL HYPERTENSION: Primary | ICD-10-CM

## 2021-01-01 DIAGNOSIS — R60.0 EDEMA LEG: ICD-10-CM

## 2021-01-01 DIAGNOSIS — Z23 NEED FOR PROPHYLACTIC VACCINATION AND INOCULATION AGAINST INFLUENZA: ICD-10-CM

## 2021-01-01 DIAGNOSIS — R41.3 MEMORY CHANGE: ICD-10-CM

## 2021-01-01 DIAGNOSIS — I87.311 CHRONIC VENOUS HYPERTENSION W ULCER OF R LOW EXTREM (HCC): ICD-10-CM

## 2021-01-01 DIAGNOSIS — I87.311 IDIOPATHIC CHRONIC VENOUS HYPERTENSION OF RIGHT LOWER EXTREMITY WITH ULCER (HCC): ICD-10-CM

## 2021-01-01 DIAGNOSIS — E78.2 MIXED HYPERLIPIDEMIA: ICD-10-CM

## 2021-01-01 DIAGNOSIS — L97.919 IDIOPATHIC CHRONIC VENOUS HYPERTENSION OF RIGHT LOWER EXTREMITY WITH ULCER (HCC): ICD-10-CM

## 2021-01-01 DIAGNOSIS — L97.919 CHRONIC VENOUS HYPERTENSION W ULCER OF R LOW EXTREM (HCC): Primary | ICD-10-CM
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
## 2021-01-01 DIAGNOSIS — I87.311 CHRONIC VENOUS HYPERTENSION W ULCER OF R LOW EXTREM (HCC): Primary | ICD-10-CM

## 2021-01-01 DIAGNOSIS — L97.919 CHRONIC VENOUS HYPERTENSION W ULCER OF R LOW EXTREM (HCC): ICD-10-CM

## 2021-01-01 DIAGNOSIS — Z20.822 SUSPECTED COVID-19 VIRUS INFECTION: Primary | ICD-10-CM

## 2021-01-01 LAB
ALBUMIN SERPL-MCNC: 3.5 G/DL (ref 3.4–5)
ALP BLD-CCNC: 114 U/L (ref 40–129)
ALT SERPL-CCNC: 20 U/L (ref 10–40)
ANION GAP SERPL CALCULATED.3IONS-SCNC: 13 MMOL/L (ref 3–16)
ANION GAP SERPL CALCULATED.3IONS-SCNC: 13 MMOL/L (ref 3–16)
AST SERPL-CCNC: 28 U/L (ref 15–37)
BILIRUB SERPL-MCNC: 0.7 MG/DL (ref 0–1)
BILIRUBIN DIRECT: 0.3 MG/DL (ref 0–0.3)
BILIRUBIN, INDIRECT: 0.4 MG/DL (ref 0–1)
BUN BLDV-MCNC: 13 MG/DL (ref 7–20)
BUN BLDV-MCNC: 14 MG/DL (ref 7–20)
CALCIUM SERPL-MCNC: 8.8 MG/DL (ref 8.3–10.6)
CALCIUM SERPL-MCNC: 9.1 MG/DL (ref 8.3–10.6)
CHLORIDE BLD-SCNC: 107 MMOL/L (ref 99–110)
CHLORIDE BLD-SCNC: 108 MMOL/L (ref 99–110)
CHOLESTEROL, TOTAL: 149 MG/DL (ref 0–199)
CO2: 24 MMOL/L (ref 21–32)
CO2: 24 MMOL/L (ref 21–32)
CREAT SERPL-MCNC: 0.9 MG/DL (ref 0.8–1.3)
CREAT SERPL-MCNC: 0.9 MG/DL (ref 0.8–1.3)
ESTIMATED AVERAGE GLUCOSE: 99.7 MG/DL
FOLATE: 6.04 NG/ML (ref 4.78–24.2)
GFR AFRICAN AMERICAN: >60
GFR AFRICAN AMERICAN: >60
GFR NON-AFRICAN AMERICAN: >60
GFR NON-AFRICAN AMERICAN: >60
GLUCOSE BLD-MCNC: 81 MG/DL (ref 70–99)
GLUCOSE BLD-MCNC: 96 MG/DL (ref 70–99)
HBA1C MFR BLD: 5.1 %
HCT VFR BLD CALC: 41.3 % (ref 40.5–52.5)
HCT VFR BLD CALC: 44.1 % (ref 40.5–52.5)
HDLC SERPL-MCNC: 42 MG/DL (ref 40–60)
HEMOGLOBIN: 13.7 G/DL (ref 13.5–17.5)
HEMOGLOBIN: 15.5 G/DL (ref 13.5–17.5)
LDL CHOLESTEROL CALCULATED: 88 MG/DL
MCH RBC QN AUTO: 30.8 PG (ref 26–34)
MCH RBC QN AUTO: 31.7 PG (ref 26–34)
MCHC RBC AUTO-ENTMCNC: 33.3 G/DL (ref 31–36)
MCHC RBC AUTO-ENTMCNC: 35 G/DL (ref 31–36)
MCV RBC AUTO: 90.6 FL (ref 80–100)
MCV RBC AUTO: 92.4 FL (ref 80–100)
PDW BLD-RTO: 14.2 % (ref 12.4–15.4)
PDW BLD-RTO: 14.7 % (ref 12.4–15.4)
PLATELET # BLD: 119 K/UL (ref 135–450)
PLATELET # BLD: 183 K/UL (ref 135–450)
PMV BLD AUTO: 9 FL (ref 5–10.5)
PMV BLD AUTO: 9.2 FL (ref 5–10.5)
POTASSIUM SERPL-SCNC: 4.1 MMOL/L (ref 3.5–5.1)
POTASSIUM SERPL-SCNC: 4.4 MMOL/L (ref 3.5–5.1)
RBC # BLD: 4.47 M/UL (ref 4.2–5.9)
RBC # BLD: 4.87 M/UL (ref 4.2–5.9)
SARS-COV-2, NAA: DETECTED
SODIUM BLD-SCNC: 144 MMOL/L (ref 136–145)
SODIUM BLD-SCNC: 145 MMOL/L (ref 136–145)
TOTAL PROTEIN: 5.7 G/DL (ref 6.4–8.2)
TRIGL SERPL-MCNC: 94 MG/DL (ref 0–150)
TSH SERPL DL<=0.05 MIU/L-ACNC: 3.41 UIU/ML (ref 0.27–4.2)
VITAMIN B-12: 240 PG/ML (ref 211–911)
VLDLC SERPL CALC-MCNC: 19 MG/DL
WBC # BLD: 5.6 K/UL (ref 4–11)
WBC # BLD: 5.9 K/UL (ref 4–11)

## 2021-01-01 PROCEDURE — 90694 VACC AIIV4 NO PRSRV 0.5ML IM: CPT | Performed by: FAMILY MEDICINE

## 2021-01-01 PROCEDURE — G8427 DOCREV CUR MEDS BY ELIG CLIN: HCPCS | Performed by: FAMILY MEDICINE

## 2021-01-01 PROCEDURE — 99214 OFFICE O/P EST MOD 30 MIN: CPT | Performed by: FAMILY MEDICINE

## 2021-01-01 PROCEDURE — 4040F PNEUMOC VAC/ADMIN/RCVD: CPT | Performed by: FAMILY MEDICINE

## 2021-01-01 PROCEDURE — G0008 ADMIN INFLUENZA VIRUS VAC: HCPCS | Performed by: FAMILY MEDICINE

## 2021-01-01 PROCEDURE — 99421 OL DIG E/M SVC 5-10 MIN: CPT | Performed by: NURSE PRACTITIONER

## 2021-01-01 PROCEDURE — G8417 CALC BMI ABV UP PARAM F/U: HCPCS | Performed by: FAMILY MEDICINE

## 2021-01-01 PROCEDURE — 1123F ACP DISCUSS/DSCN MKR DOCD: CPT | Performed by: FAMILY MEDICINE

## 2021-01-01 PROCEDURE — 1036F TOBACCO NON-USER: CPT | Performed by: FAMILY MEDICINE

## 2021-01-01 PROCEDURE — G8484 FLU IMMUNIZE NO ADMIN: HCPCS | Performed by: FAMILY MEDICINE

## 2021-01-01 PROCEDURE — 99213 OFFICE O/P EST LOW 20 MIN: CPT | Performed by: FAMILY MEDICINE

## 2021-01-01 PROCEDURE — G8428 CUR MEDS NOT DOCUMENT: HCPCS | Performed by: FAMILY MEDICINE

## 2021-01-01 PROCEDURE — G0180 MD CERTIFICATION HHA PATIENT: HCPCS | Performed by: FAMILY MEDICINE

## 2021-01-01 RX ORDER — LISINOPRIL 20 MG/1
20 TABLET ORAL 2 TIMES DAILY
Qty: 60 TABLET | Refills: 2 | Status: SHIPPED | OUTPATIENT
Start: 2021-01-01 | End: 2021-01-01

## 2021-01-01 RX ORDER — LISINOPRIL 20 MG/1
TABLET ORAL
Qty: 60 TABLET | Refills: 2 | Status: SHIPPED | OUTPATIENT
Start: 2021-01-01 | End: 2021-01-01

## 2021-01-01 RX ORDER — LISINOPRIL 20 MG/1
TABLET ORAL
Qty: 60 TABLET | Refills: 2 | Status: SHIPPED | OUTPATIENT
Start: 2021-01-01

## 2021-01-01 RX ORDER — FUROSEMIDE 40 MG/1
40 TABLET ORAL DAILY
Qty: 90 TABLET | Refills: 1 | Status: SHIPPED | OUTPATIENT
Start: 2021-01-01

## 2021-01-01 RX ORDER — FUROSEMIDE 20 MG/1
20 TABLET ORAL DAILY
Qty: 90 TABLET | Refills: 0 | Status: SHIPPED | OUTPATIENT
Start: 2021-01-01 | End: 2021-01-01

## 2021-01-01 SDOH — HEALTH STABILITY: PHYSICAL HEALTH: ON AVERAGE, HOW MANY MINUTES DO YOU ENGAGE IN EXERCISE AT THIS LEVEL?: 0 MIN

## 2021-01-01 SDOH — ECONOMIC STABILITY: HOUSING INSECURITY
IN THE LAST 12 MONTHS, WAS THERE A TIME WHEN YOU DID NOT HAVE A STEADY PLACE TO SLEEP OR SLEPT IN A SHELTER (INCLUDING NOW)?: NO

## 2021-01-01 SDOH — ECONOMIC STABILITY: HOUSING INSECURITY: IN THE LAST 12 MONTHS, HOW MANY PLACES HAVE YOU LIVED?: 2

## 2021-01-01 SDOH — HEALTH STABILITY: PHYSICAL HEALTH: ON AVERAGE, HOW MANY DAYS PER WEEK DO YOU ENGAGE IN MODERATE TO STRENUOUS EXERCISE (LIKE A BRISK WALK)?: 0 DAYS

## 2021-01-01 SDOH — ECONOMIC STABILITY: INCOME INSECURITY: IN THE LAST 12 MONTHS, WAS THERE A TIME WHEN YOU WERE NOT ABLE TO PAY THE MORTGAGE OR RENT ON TIME?: NO

## 2021-01-01 ASSESSMENT — SOCIAL DETERMINANTS OF HEALTH (SDOH)
HOW OFTEN DO YOU GET TOGETHER WITH FRIENDS OR RELATIVES?: THREE TIMES A WEEK
IN A TYPICAL WEEK, HOW MANY TIMES DO YOU TALK ON THE PHONE WITH FAMILY, FRIENDS, OR NEIGHBORS?: THREE TIMES A WEEK
HOW OFTEN DO YOU ATTEND CHURCH OR RELIGIOUS SERVICES?: NEVER
DO YOU BELONG TO ANY CLUBS OR ORGANIZATIONS SUCH AS CHURCH GROUPS UNIONS, FRATERNAL OR ATHLETIC GROUPS, OR SCHOOL GROUPS?: NO
HOW OFTEN DO YOU ATTENT MEETINGS OF THE CLUB OR ORGANIZATION YOU BELONG TO?: NEVER

## 2021-01-01 ASSESSMENT — ENCOUNTER SYMPTOMS
EYES NEGATIVE: 1
GASTROINTESTINAL NEGATIVE: 1
GASTROINTESTINAL NEGATIVE: 1
RESPIRATORY NEGATIVE: 1
EYES NEGATIVE: 1
EYES NEGATIVE: 1
RESPIRATORY NEGATIVE: 1
GASTROINTESTINAL NEGATIVE: 1
EYES NEGATIVE: 1
GASTROINTESTINAL NEGATIVE: 1
RESPIRATORY NEGATIVE: 1
RESPIRATORY NEGATIVE: 1

## 2021-01-01 ASSESSMENT — PATIENT HEALTH QUESTIONNAIRE - PHQ9
1. LITTLE INTEREST OR PLEASURE IN DOING THINGS: 0
SUM OF ALL RESPONSES TO PHQ QUESTIONS 1-9: 0
SUM OF ALL RESPONSES TO PHQ QUESTIONS 1-9: 0
2. FEELING DOWN, DEPRESSED OR HOPELESS: 0

## 2021-01-01 ASSESSMENT — LIFESTYLE VARIABLES: HOW OFTEN DO YOU HAVE A DRINK CONTAINING ALCOHOL: NEVER

## 2021-01-08 NOTE — TELEPHONE ENCOUNTER
Medication:   Requested Prescriptions     Pending Prescriptions Disp Refills    lisinopril (PRINIVIL;ZESTRIL) 20 MG tablet 60 tablet 2     Sig: Take 1 tablet by mouth 2 times daily         Last appt: 12/10/2020   Next appt: 2/4/2021    Last OARRS: No flowsheet data found.

## 2021-01-08 NOTE — PROGRESS NOTES
Wily Broderick received a viral test for COVID-19. They were educated on isolation and quarantine as appropriate. For any symptoms, they were directed to seek care from their PCP, given contact information to establish with a doctor, directed to an urgent care or the emergency room.

## 2021-01-11 NOTE — RESULT ENCOUNTER NOTE
Patient's wife notified of results. Your test came back detected/positive for Covid-19. What happens if I have a positive test?  If you have symptoms:  Isolate until all three of these things are true: 1) your symptoms are better, 2) it has been 10 days since you first felt sick, and 3) you have had no fever for at least 24 hours without using medicine that lowers fever. Drink plenty of fluids and eat when you can. You may take medicine for pain or fever if you need to. Rest as much as you can. If you do not have symptoms:  Stay home for 10 days after the date you were tested. If you develop symptoms during those 10 days, stay home until all three of these things are true: 1) your symptoms are better, 2) it has been 10 days since you first felt sick, and 3) you have had no fever for at least 24 hours without using medicine that lowers fever. Follow care instructions from your doctor or other healthcare provider. Seek emergency medical care immediately if you have trouble breathing, persistent pain or pressure in the chest, new confusion, inability to wake or stay awake, or bluish lips or face. Someone from the health department (case investigator or contact tracer) may reach out to you to check on your health and ask about other people you have been around or where you've spent time while you may have been able to spread COVID-19 to others. This person's role is strictly to map the virus to help identify people who may have been exposed to the virus and prevent its spread. The local health department will also provide guidance on how to stay safely at home to avoid spreading illness. Detected results can happen for months and you are not considered contagious. No retest is necessary.

## 2021-02-05 NOTE — TELEPHONE ENCOUNTER
SW Contact per referral for safety d/t self neglect. Suspected Dementia resulting in self neglect and beg bug infestation witnessed last visit. Spouse is also a patient of concern. No other next of kin for this couple listed on record or decision maker documents present. Referral to [facility]. Spoke with Jace Ha. Visit will be within 3 business days for protective check. Will follow up next week to see if it was warranted to open active APS case.

## 2021-02-05 NOTE — PROGRESS NOTES
SUBJECTIVE:  Patient ID: Meng Esposito is a 80 y.o. y.o. male       Hypertension: Patient here for follow-up of elevated blood pressure. He is not exercising and is adherent to low salt diet. Blood pressure is well controlled at home. Cardiac symptoms none. Patient denies chest pain, chest pressure/discomfort, claudication, dyspnea, exertional chest pressure/discomfort, fatigue, irregular heart beat, lower extremity edema, near-syncope, orthopnea, palpitations, paroxysmal nocturnal dyspnea, syncope and tachypnea. Cardiovascular risk factors: advanced age (older than 54 for men, 72 for women), hypertension, male gender, obesity (BMI >= 30 kg/m2) and sedentary lifestyle. Use of agents associated with hypertension: none. History of target organ damage: none. The swelling of his leg has improved significantly he has been seen by the wound care they gave him some instruction to take care of it is been getting better  Nurse has noticed he has so many bedbugs in his hair and his body and cloths  Admit apparently have it at home they thought it is all gone  Recently lost per the wife he did not have his phone with them and he went to the right direction  Patient  Last annual wellness he failed his cognition to have testing at that time I recommend further evaluation with see neuropsychologist  they did not do the follow-up  Lost significant weight per the wife after he had Covid his appetite has changed been eating as much as he used to per the patient he wants to lose weight concerned about that too.   Past Medical History:   Diagnosis Date    Bradycardia     Dr Lupe Lorenzana Hyperlipidemia     Hypertension     Polio age 2   [facility] Status post eye surgery 12-3-07    lens implant      Past Surgical History:   Procedure Laterality Date    CARDIOVASCULAR STRESS TEST  2008    Dr [facility]  june 2009    Dr Roseanne Cardenas  June 2012   [address] [place] [address]     Family History Problem Relation Age of Onset    Breast Cancer Mother     Cancer Mother         breast ca,    Other Father         bone cancer    Cancer Father         prostate ca,  age 61     Social History     Socioeconomic History    Marital status:      Spouse name: None    Number of children: None    Years of education: None    Highest education level: None   Occupational History    Occupation: retired [employer]   Social Needs    Financial resource strain: Not hard at all   [employer] insecurity     Worry: Never true     Inability: Never true    Transportation needs     Medical: No     Non-medical: No   Tobacco Use    Smoking status: Never Smoker    Smokeless tobacco: Never Used   Substance and Sexual Activity    Alcohol use: No    Drug use: Never    Sexual activity: Not Currently     Partners: Female     Comment:    Lifestyle    Physical activity     Days per week: None     Minutes per session: None    Stress: None   Relationships    Social connections     Talks on phone: None     Gets together: None     Attends Amish service: None     Active member of club or organization: None     Attends meetings of clubs or organizations: None     Relationship status: None    Intimate partner violence     Fear of current or ex partner: None     Emotionally abused: None     Physically abused: None     Forced sexual activity: None   Other Topics Concern    None   Social History Narrative    None     Current Outpatient Medications   Medication Sig Dispense Refill    lisinopril (PRINIVIL;ZESTRIL) 20 MG tablet Take 1 tablet by mouth 2 times daily 60 tablet 2    furosemide (LASIX) 40 MG tablet TAKE 1 TABLET BY MOUTH DAILY 90 tablet 1    Compression Stockings MISC by Does not apply route Strength 30-40mmHg  Style: Other velcro compression stockings  Extremity: bilateral  Donning aid recommended: No 1 each 0    aspirin 81 MG EC tablet Take 81 mg by mouth daily.       Flaxseed, Linseed, (FLAX SEED OIL) 1000 MG CAPS Take  by mouth. No current facility-administered medications for this visit. Allergies   Allergen Reactions    Penicillins        Review of Systems   Constitutional: Negative. HENT: Negative. Eyes: Negative. Respiratory: Negative. Cardiovascular: Positive for leg swelling. Gastrointestinal: Negative. All other systems reviewed and are negative. OBJECTIVE:  /62   Pulse 50   Temp 96.3 °F (35.7 °C)   Wt 210 lb (95.3 kg)   SpO2 99%   BMI 29.29 kg/m²     Physical Exam  Vitals signs reviewed. Constitutional:       Appearance: He is well-developed. He is ill-appearing. Neck:      Thyroid: No thyromegaly. Vascular: No JVD. Musculoskeletal: Normal range of motion. Left lower leg: Edema present. Legs:    Skin:         Neurological:      Mental Status: He is alert. ASSESSMENT:     Diagnosis Orders   1. Essential hypertension  Basic Metabolic Panel    CBC    Hepatic Function Panel    Hemoglobin A1C    TSH without Reflex    Lipid Panel    Vitamin B12 & Folate   2. Weight loss  Basic Metabolic Panel    CBC    Hepatic Function Panel    Hemoglobin A1C    TSH without Reflex    Lipid Panel    Vitamin B12 & Folate   3. Memory change     4.  Bedbug bite, sequela         PLAN:  See orders  We will get to our  involved to see if we can help with getting rid of the bedbugs  Also to follow-up on further evaluation for his memory change  I advised not to drive gets further evaluation

## 2021-03-18 NOTE — TELEPHONE ENCOUNTER
SW f/u  APS referral 2/5. Patient has active case with APS. Message left for  Tyrone [place] [phone] for an update. Alerted them pt has upcoming apt with PCP and update would be useful.

## 2021-03-18 NOTE — PROGRESS NOTES
SUBJECTIVE:  Patient ID: Chasity Robin is a 80 y.o. y.o. male     HPI   Hypertension: Patient here for follow-up of elevated blood pressure. He is not exercising and is adherent to low salt diet.  Blood pressure is well controlled at home. Cardiac symptoms none. Patient denies chest pain, chest pressure/discomfort, claudication, dyspnea, exertional chest pressure/discomfort, fatigue, irregular heart beat, lower extremity edema, near-syncope, orthopnea, palpitations, paroxysmal nocturnal dyspnea, syncope and tachypnea.  Cardiovascular risk factors: advanced age (older than 54 for men, 72 for women), hypertension, male gender, obesity (BMI >= 30 kg/m2) and sedentary lifestyle. Use of agents associated with hypertension: none. History of target organ damage: none. The swelling of his leg has improved significantly he has been seen by the wound care they gave him some instruction to take care of it is been getting better  His son and daughter-in-law are present in the room with them there is decline in his mental status we have recommended cognitive assessment while ago it has not been done he lost couple times the senior protective service in [facility] were involved  They took the keys away from him  The kids now involved in their care and they can start working on placement for them  and Mr. Liliana Sims.   Past Medical History:   Diagnosis Date    Bradycardia     Dr Lulú Randolph Hyperlipidemia     Hypertension     Polio age 3    Status post eye surgery 12-3-07    lens implant      Past Surgical History:   Procedure Laterality Date    CARDIOVASCULAR STRESS TEST      Dr Ariel Cabot  2009    Dr Moss Gist    COLONOSCOPY  2012   Jennifer Clark     Family History   Problem Relation Age of Onset    Breast Cancer Mother     Cancer Mother         breast ca,    Other Father         bone cancer    Cancer Father         prostate ca,  age 61 Social History     Socioeconomic History    Marital status:      Spouse name: Not on file    Number of children: Not on file    Years of education: Not on file    Highest education level: Not on file   Occupational History    Occupation: retired [employer]   Social Needs    Financial resource strain: Not hard at all   [place] insecurity     Worry: Never true     Inability: Never true   [employer] needs     Medical: No     Non-medical: No   Tobacco Use    Smoking status: Never Smoker    Smokeless tobacco: Never Used   Substance and Sexual Activity    Alcohol use: No    Drug use: Never    Sexual activity: Not Currently     Partners: Female     Comment:    Lifestyle    Physical activity     Days per week: Not on file     Minutes per session: Not on file    Stress: Not on file   Relationships    Social connections     Talks on phone: Not on file     Gets together: Not on file     Attends Latter day service: Not on file     Active member of club or organization: Not on file     Attends meetings of clubs or organizations: Not on file     Relationship status: Not on file    Intimate partner violence     Fear of current or ex partner: Not on file     Emotionally abused: Not on file     Physically abused: Not on file     Forced sexual activity: Not on file   Other Topics Concern    Not on file   Social History Narrative    Not on file     Current Outpatient Medications   Medication Sig Dispense Refill    lisinopril (PRINIVIL;ZESTRIL) 20 MG tablet Take 1 tablet by mouth 2 times daily 60 tablet 2    furosemide (LASIX) 40 MG tablet TAKE 1 TABLET BY MOUTH DAILY 90 tablet 1    Compression Stockings MISC by Does not apply route Strength 30-40mmHg  Style: Other velcro compression stockings  Extremity: bilateral  Donning aid recommended: No 1 each 0    aspirin 81 MG EC tablet Take 81 mg by mouth daily.  Flaxseed, Linseed, (FLAX SEED OIL) 1000 MG CAPS Take  by mouth.        No current facility-administered medications for this visit. Allergies   Allergen Reactions    Penicillins        Review of Systems   Constitutional: Negative. HENT: Negative. Eyes: Negative. Respiratory: Negative. Cardiovascular: Positive for leg swelling. Gastrointestinal: Negative. All other systems reviewed and are negative. OBJECTIVE:  BP (!) 150/80 (Site: Left Upper Arm, Position: Sitting, Cuff Size: Large Adult)   Pulse 99   Resp 14   Wt 213 lb (96.6 kg)   SpO2 99%   BMI 29.71 kg/m²     Physical Exam  Vitals signs reviewed. Constitutional:       Appearance: He is well-developed. Eyes:      General: No scleral icterus. Conjunctiva/sclera: Conjunctivae normal.   Neck:      Musculoskeletal: Normal range of motion and neck supple. Thyroid: No thyromegaly. Vascular: No carotid bruit or JVD. Cardiovascular:      Rate and Rhythm: Normal rate and regular rhythm. Heart sounds: Normal heart sounds. No murmur. Pulmonary:      Effort: Pulmonary effort is normal. No respiratory distress. Breath sounds: Normal breath sounds. No wheezing or rales. Chest:      Chest wall: No tenderness. Abdominal:      General: Bowel sounds are normal. There is no distension. Palpations: Abdomen is soft. There is no mass. Tenderness: There is no abdominal tenderness. There is no guarding or rebound. Musculoskeletal: Normal range of motion. General: No tenderness. Skin:     Findings: No rash. Neurological:      Mental Status: He is alert and oriented to person, place, and time. ASSESSMENT:   Diagnosis Orders   1. Essential hypertension     2. Edema leg     3. Memory change           PLAN:    Continue the same  Had long discussion with the son and daughter-in-law about placement/cognitive assessment  Answered all their concerns questions.

## 2021-03-19 NOTE — TELEPHONE ENCOUNTER
Delayed Entry-3/18/21  Marivel cohen/[facility] APS called   States that patient does not drive-son has taken keys after he was lost on his way home from the hospital   Son & Daughter in law are not active in daily care, treating bed bugs. Cameron oTlliver also stated that the house was not dirty just cluttered. Per the patients wife, thinking about selling home, and [place].

## 2021-03-19 NOTE — TELEPHONE ENCOUNTER
SW contact per referral and f/u discussion with VINICIUS Carey) to provide caregiver resources to son Tyrone Carrington. APS reports this patient recently got lost while driving and children have taken keys away. She states she is working with family who is involved and seeking extermination for bedbug infestation and alternate living options. Spoke with son to offer telehealth option for patient until this issue is resolved d/t live bedbugs witnessed during last office visit. Family is able to assist with this option and agreeable already has assisted with signing up for [facility]. Son reports they are establishing ACP docs with  on retainer. Highly recommend family take care of this and submit for HIPPA reasons and in anticipation of the progression of dementia. Son verbalized an understanding. Son declines referral to the [employer] for education, resources and nurse or  care consultation support at this time.

## 2021-04-13 NOTE — TELEPHONE ENCOUNTER
Medication:   Requested Prescriptions     Pending Prescriptions Disp Refills    lisinopril (PRINIVIL;ZESTRIL) 20 MG tablet [Pharmacy Med Name: LISINOPRIL 20MG TABLETS] 60 tablet 2     Sig: TAKE 1 TABLET BY MOUTH TWICE DAILY       Last Filled:      Patient Phone Number: [phone] (home)     Last appt: 3/18/2021   Next appt: Visit date not found    Last BMP:   Lab Results   Component Value Date     02/04/2021    K 4.1 02/04/2021     02/04/2021    CO2 24 02/04/2021    ANIONGAP 13 02/04/2021    GLUCOSE 96 02/04/2021    BUN 14 02/04/2021    CREATININE 0.9 02/04/2021    LABGLOM >60 02/04/2021    LABGLOM >60 04/14/2012    LABGLOM >60 04/14/2012    GFRAA >60 02/04/2021    GFRAA >60 04/01/2013    CALCIUM 9.1 02/04/2021      Last CMP:   Lab Results   Component Value Date     02/04/2021    K 4.1 02/04/2021     02/04/2021    CO2 24 02/04/2021    ANIONGAP 13 02/04/2021    GLUCOSE 96 02/04/2021    BUN 14 02/04/2021    CREATININE 0.9 02/04/2021    LABGLOM >60 02/04/2021    LABGLOM >60 04/14/2012    LABGLOM >60 04/14/2012    GFRAA >60 02/04/2021    GFRAA >60 04/01/2013    PROT 5.7 02/04/2021    LABALBU 3.5 02/04/2021    BILITOT 0.7 02/04/2021    ALKPHOS 114 02/04/2021    ALT 20 02/04/2021    AST 28 02/04/2021     Last Renal Function:   Lab Results   Component Value Date     02/04/2021    K 4.1 02/04/2021     02/04/2021    CO2 24 02/04/2021    GLUCOSE 96 02/04/2021    BUN 14 02/04/2021    CREATININE 0.9 02/04/2021    LABALBU 3.5 02/04/2021    CALCIUM 9.1 02/04/2021    GFR >60 04/01/2013    GFRAA >60 02/04/2021    GFRAA >60 04/01/2013       Last OARRS: No flowsheet data found.     Preferred Pharmacy:   [facility], [id number] [place] [address] -  [phone] Naren Milian [phone]  [facility] [address]  Phone: [phone] Fax: [phone]    [facility] [address] [place] [address] [phone] - C

## 2021-04-26 NOTE — TELEPHONE ENCOUNTER
Medication:   Requested Prescriptions     Pending Prescriptions Disp Refills    furosemide (LASIX) 20 MG tablet [Pharmacy Med Name: FUROSEMIDE 20MG TABLETS] 90 tablet 0     Sig: TAKE 1 TABLET BY MOUTH DAILY     Last Filled: 12.10.20    Last appt: 3/18/2021   Next appt: Visit date not found    Last OARRS: No flowsheet data found.

## 2021-06-11 NOTE — PROGRESS NOTES
SUBJECTIVE:  Patient ID: Jeff Nunn is a 80 y.o. y.o. male     HPI   Hypertension: Patient here for follow-up of elevated blood pressure. He is not exercising and is adherent to low salt diet.  Blood pressure is well controlled at home. Cardiac symptoms none. Patient denies chest pain, chest pressure/discomfort, claudication, dyspnea, exertional chest pressure/discomfort, fatigue, irregular heart beat, lower extremity edema, near-syncope, orthopnea, palpitations, paroxysmal nocturnal dyspnea, syncope and tachypnea.  Cardiovascular risk factors: advanced age (older than 54 for men, 72 for women), hypertension, male gender, obesity (BMI >= 30 kg/m2) and sedentary lifestyle. Use of agents associated with hypertension: none. History of target organ damage: none.   Today recently had a full lab neuropsych evaluation mention possible Alzheimer in early stages, daughter-in-law present with him in the room they moved him out of their home they are in no independent living trying to settle things for them, he is not driving anymore though family trying to help,  Patient with lower leg edema according to him but is getting better  Past Medical History:   Diagnosis Date    Bradycardia     Dr Roblero Hawarden Hyperlipidemia     Hypertension     Polio age 2    Status post eye surgery 12-3-07    lens implant      Past Surgical History:   Procedure Laterality Date    CARDIOVASCULAR STRESS TEST      Dr Eben Bello  2009    Dr Aviva Cheema  2012   Caitlin Lester     Family History   Problem Relation Age of Onset    Breast Cancer Mother    Colin Mares Mother         breast ca,    Other Father         bone cancer    Cancer Father         prostate ca,  age 61     Social History     Socioeconomic History    Marital status:      Spouse name: None    Number of children: None    Years of education: None    Highest education level: None Occupational History    Occupation: retired [employer]   Tobacco Use    Smoking status: Never Smoker    Smokeless tobacco: Never Used   Vaping Use    Vaping Use: Never used   Substance and Sexual Activity    Alcohol use: No    Drug use: Never    Sexual activity: Not Currently     Partners: Female     Comment:    Other Topics Concern    None   Social History Narrative    None     Social Determinants of Health     Financial Resource Strain: Low Risk     Difficulty of Paying Living Expenses: Not hard at all   Food Insecurity: No Food Insecurity    Worried About Running Out of Food in the Last Year: Never true    [address] in the Last Year: Never true   Transportation Needs: No Transportation Needs    Lack of Transportation (Medical): No    Lack of Transportation (Non-Medical): No   Physical Activity:     Days of Exercise per Week:     Minutes of Exercise per Session:    Stress:     Feeling of Stress :    Social Connections:     Frequency of Communication with Friends and Family:     Frequency of Social Gatherings with Friends and Family:     Attends Catholic Services:     Active Member of Clubs or Organizations:     Attends Club or Organization Meetings:     Marital Status:    Intimate Partner Violence:     Fear of Current or Ex-Partner:     Emotionally Abused:     Physically Abused:     Sexually Abused:      Current Outpatient Medications   Medication Sig Dispense Refill    lisinopril (PRINIVIL;ZESTRIL) 20 MG tablet TAKE 1 TABLET BY MOUTH TWICE DAILY 60 tablet 2    furosemide (LASIX) 40 MG tablet TAKE 1 TABLET BY MOUTH DAILY 90 tablet 1    Compression Stockings MISC by Does not apply route Strength 30-40mmHg  Style: Other velcro compression stockings  Extremity: bilateral  Donning aid recommended: No 1 each 0    aspirin 81 MG EC tablet Take 81 mg by mouth daily.  Flaxseed, Linseed, (FLAX SEED OIL) 1000 MG CAPS Take  by mouth.        No current facility-administered medications for this visit. Allergies   Allergen Reactions    Penicillins        Review of Systems   Constitutional: Negative. HENT: Negative. Eyes: Negative. Respiratory: Negative. Cardiovascular: Positive for leg swelling. Gastrointestinal: Negative. All other systems reviewed and are negative. OBJECTIVE:  /82 (Site: Right Upper Arm, Position: Sitting, Cuff Size: Small Adult)   Pulse 57   Resp 14   Wt 212 lb 12.8 oz (96.5 kg)   SpO2 97%   BMI 29.68 kg/m²     Physical Exam  Vitals reviewed. Constitutional:       Appearance: He is well-developed. Eyes:      General: No scleral icterus. Conjunctiva/sclera: Conjunctivae normal.   Neck:      Thyroid: No thyromegaly. Vascular: No carotid bruit or JVD. Cardiovascular:      Rate and Rhythm: Normal rate and regular rhythm. Heart sounds: Normal heart sounds. No murmur heard. Pulmonary:      Effort: Pulmonary effort is normal. No respiratory distress. Breath sounds: Normal breath sounds. No wheezing or rales. Chest:      Chest wall: No tenderness. Abdominal:      General: Bowel sounds are normal. There is no distension. Palpations: Abdomen is soft. There is no mass. Tenderness: There is no abdominal tenderness. There is no guarding or rebound. Musculoskeletal:         General: No tenderness. Normal range of motion. Cervical back: Normal range of motion and neck supple. Skin:     Findings: No rash. Neurological:      Mental Status: He is alert and oriented to person, place, and time. ASSESSMENT:   Diagnosis Orders   1. Essential hypertension  External Referral To Home Health    Basic Metabolic Panel    CBC   2. Mixed hyperlipidemia     3. Edema leg  External Referral To Home Health   4. Memory change  JODIE - Kelsi Taylor MD, Neurology, [facility]   5. Ulcer of right lower extremity, unspecified ulcer stage ([facility] [address]     6.  Idiopathic chronic venous hypertension of

## 2021-06-17 NOTE — TELEPHONE ENCOUNTER
Patients daughter in law Kadi Swain)  states that she believes nobody has come to look at the wound on his right leg. Would like to know if there is anyway to know if they have been there. Rebecca Phan has been trying to get a hold of the wound care nurse and hasn't had any luck getting a hold of them.    If someone could give her a call after 3:30 would be great     [phone]    Thank you

## 2021-06-18 NOTE — TELEPHONE ENCOUNTER
Luis Alfredo Burton from [facility] need clarification on a order they received for wound care.  Please call david YANG @ [phone]

## 2021-06-22 NOTE — TELEPHONE ENCOUNTER
Rosalva cohen/[facility] calling   Needed additional information regarding wound care right leg.  Will do assessment and send order to be signed

## 2021-07-02 NOTE — TELEPHONE ENCOUNTER
[facility] calling, need to see if 6/11 OV note can be addended to state \" Ulcer on Right leg\"

## 2021-07-22 NOTE — TELEPHONE ENCOUNTER
Medication:   Requested Prescriptions     Pending Prescriptions Disp Refills    lisinopril (PRINIVIL;ZESTRIL) 20 MG tablet [Pharmacy Med Name: LISINOPRIL 20MG TABLETS] 60 tablet 2     Sig: TAKE 1 TABLET BY MOUTH TWICE DAILY     Last Filled:  04/13/21    Last appt: 6/11/2021   Next appt: 9/10/2021    Last OARRS: No flowsheet data found.

## 2021-09-10 NOTE — PROGRESS NOTES
Vaccine Information Sheet, \"Influenza - Inactivated\"  given to Shama Fields, or parent/legal guardian of  Shama Fields and verbalized understanding. Patient responses:    Have you ever had a reaction to a flu vaccine? No  Are you able to eat eggs without adverse effects? Yes  Do you have any current illness? No  Have you ever had Guillian [place] Syndrome? No    Immunizations Administered     Name Date Dose Route    Influenza, Quadv, adjuvanted, 65 yrs +, IM, PF (Fluad) 9/10/2021 0.5 mL Intramuscular    Site: Deltoid- Left    Lot: [id number]    NDC: 19126-447-01          Flu vaccine given per order. Please see immunization tab. Patient instructed to remain in clinic for 20 minutes after injection and was advised to report any adverse reaction to me immediately.

## 2021-09-10 NOTE — PROGRESS NOTES
SUBJECTIVE:  Patient ID: Lyubov Luo is a 80 y.o. y.o. male     HPI   Hypertension: Patient here for follow-up of elevated blood pressure. He is not exercising and is adherent to low salt diet.  Blood pressure is well controlled at home. Cardiac symptoms none. Patient denies chest pain, chest pressure/discomfort, claudication, dyspnea, exertional chest pressure/discomfort, fatigue, irregular heart beat, lower extremity edema, near-syncope, orthopnea, palpitations, paroxysmal nocturnal dyspnea, syncope and tachypnea.  Cardiovascular risk factors: advanced age (older than 54 for men, 72 for women), hypertension, male gender, obesity (BMI >= 30 kg/m2) and sedentary lifestyle. Use of agents associated with hypertension: none. History of target organ damage: none.   Today recently had a full lab neuropsych evaluation mention possible Alzheimer in early stages, daughter-in-law present with him in the room they moved him out of their home they are in no independent living trying to settle things for them, he is not driving anymore though family trying to help,  Patient with lower leg edema according to him but is getting better  Past Medical History:   Diagnosis Date    Bradycardia     Dr Ifrah Hodge Hyperlipidemia     Hypertension     Polio age 2    Status post eye surgery 12-3-07    lens implant      Past Surgical History:   Procedure Laterality Date    CARDIOVASCULAR STRESS TEST      Dr Kelsie Ariza  2009    Dr Milton Chow  2012   Akiko Plata     Family History   Problem Relation Age of Onset    Breast Cancer Mother    Avelino Ortiz Mother         breast ca,    Other Father         bone cancer    Cancer Father         prostate ca,  age 61     Social History     Socioeconomic History    Marital status:      Spouse name: None    Number of children: None    Years of education: None    Highest education level: None Occupational History    Occupation: retired [employer]   Tobacco Use    Smoking status: Never Smoker    Smokeless tobacco: Never Used   Vaping Use    Vaping Use: Never used   Substance and Sexual Activity    Alcohol use: No    Drug use: Never    Sexual activity: Not Currently     Partners: Female     Comment:    Other Topics Concern    None   Social History Narrative    None     Social Determinants of Health     Financial Resource Strain: Low Risk     Difficulty of Paying Living Expenses: Not hard at all   Food Insecurity: No Food Insecurity    Worried About Running Out of Food in the Last Year: Never true    [address] in the Last Year: Never true   Transportation Needs: No Transportation Needs    Lack of Transportation (Medical): No    Lack of Transportation (Non-Medical): No   Physical Activity: Inactive    Days of Exercise per Week: 0 days    Minutes of Exercise per Session: 0 min   Stress: No Stress Concern Present    Feeling of Stress : Not at all   Social Connections: Moderately Isolated    Frequency of Communication with Friends and Family: Three times a week    Frequency of Social Gatherings with Friends and Family: Three times a week    Attends Taoism Services: Never    Active Member of Clubs or Organizations: No    Attends Club or Organization Meetings: Never    Marital Status:    Intimate Partner Violence:     Fear of Current or Ex-Partner:     Emotionally Abused:     Physically Abused:     Sexually Abused:      Current Outpatient Medications   Medication Sig Dispense Refill    lisinopril (PRINIVIL;ZESTRIL) 20 MG tablet TAKE 1 TABLET BY MOUTH TWICE DAILY 60 tablet 2    furosemide (LASIX) 40 MG tablet TAKE 1 TABLET BY MOUTH DAILY (Patient taking differently: Take 20 mg by mouth daily ) 90 tablet 1    Compression Stockings MISC by Does not apply route Strength 30-40mmHg  Style:  Other velcro compression stockings  Extremity: bilateral  Donning aid recommended: No 1 each 0    aspirin 81 MG EC tablet Take 81 mg by mouth daily.  Flaxseed, Linseed, (FLAX SEED OIL) 1000 MG CAPS Take  by mouth. No current facility-administered medications for this visit. Allergies   Allergen Reactions    Penicillins        Review of Systems   Constitutional: Negative. HENT: Negative. Eyes: Negative. Respiratory: Negative. Cardiovascular: Positive for leg swelling. Gastrointestinal: Negative. All other systems reviewed and are negative. OBJECTIVE:  /82 (Site: Left Upper Arm, Position: Sitting, Cuff Size: Small Adult)   Pulse 87   Resp 12   Ht 5' 11.5\" (1.816 m)   Wt 220 lb (99.8 kg)   SpO2 97%   BMI 30.26 kg/m²     Physical Exam  Vitals reviewed. Constitutional:       Appearance: He is well-developed. Eyes:      General: No scleral icterus. Conjunctiva/sclera: Conjunctivae normal.   Neck:      Thyroid: No thyromegaly. Vascular: No carotid bruit or JVD. Cardiovascular:      Rate and Rhythm: Normal rate and regular rhythm. Heart sounds: Normal heart sounds. No murmur heard. Pulmonary:      Effort: Pulmonary effort is normal. No respiratory distress. Breath sounds: Normal breath sounds. No wheezing or rales. Chest:      Chest wall: No tenderness. Abdominal:      General: Bowel sounds are normal. There is no distension. Palpations: Abdomen is soft. There is no mass. Tenderness: There is no abdominal tenderness. There is no guarding or rebound. Musculoskeletal:         General: No tenderness. Normal range of motion. Cervical back: Normal range of motion and neck supple. Skin:     Findings: No rash. Neurological:      Mental Status: He is alert and oriented to person, place, and time. ASSESSMENT:   Diagnosis Orders   1. Essential hypertension     2. Chronic venous hypertension w ulcer of r low extrem (HCC)     3. Edema leg     4.  Need for prophylactic vaccination and inoculation against influenza  INFLUENZA, QUADV, ADJUVANTED, 72 YRS =, IM, PF, PREFILL SYR, 0.5ML (FLUAD)       PLAN:  Doing well   Continue the same

## 2021-09-20 NOTE — TELEPHONE ENCOUNTER
[address] calling to repeat patient  as of 21. Dr Valerie Ramirez spoke with office Jhoana Gilbert.

## 2021-09-21 ENCOUNTER — TELEPHONE (OUTPATIENT)
Dept: PRIMARY CARE CLINIC | Age: 86
End: 2021-09-21

## 2021-09-21 NOTE — TELEPHONE ENCOUNTER
Elvia Shaikh from [facility] is calling. She needs this pt's death certificate signed and returned to them as soon as possible. This pt is to be cremated. They can't do that until the death certificate is signed. She says that the  for the pt is Friday. They need to have him cremated by Thursday.     LOV 9/10/21

## 2021-10-18 NOTE — PROGRESS NOTES
SUBJECTIVE:  Patient ID: Shama Fields is a 80 y.o. y.o. male     HPI   Hypertension: Patient here for follow-up of elevated blood pressure. He is not exercising and is adherent to low salt diet. Blood pressure is well controlled at home. Cardiac symptoms none. Patient denies chest pain, chest pressure/discomfort, claudication, dyspnea, exertional chest pressure/discomfort, fatigue, irregular heart beat, lower extremity edema, near-syncope, orthopnea, palpitations, paroxysmal nocturnal dyspnea, syncope and tachypnea. Cardiovascular risk factors: advanced age (older than 54 for men, 72 for women), hypertension, male gender, obesity (BMI >= 30 kg/m2) and sedentary lifestyle. Use of agents associated with hypertension: none. History of target organ damage: none.   Doing a lot better on current medication the Lasix had helped him with the swelling there is change about 5 pound his weight  He was seen by wound care given instruction to take care of his skin  Past Medical History:   Diagnosis Date    Bradycardia     Dr Silvia Mcwilliams Hyperlipidemia     Hypertension     Polio age 2    Status post eye surgery 12-3-07    lens implant      Past Surgical History:   Procedure Laterality Date    CARDIOVASCULAR STRESS TEST      Dr Keily Quezada  2009    Dr Spike Pitts    COLONOSCOPY  2012   Terri Mcdaniel     Family History   Problem Relation Age of Onset    Breast Cancer Mother    Dominik Mao Mother         breast ca,    Other Father         bone cancer    Cancer Father         prostate ca,  age 61     Social History     Socioeconomic History    Marital status:      Spouse name: None    Number of children: None    Years of education: None    Highest education level: None   Occupational History    Occupation: retired [employer]   Social Needs    Financial resource strain: Not hard at all   Nessa-Guilherme insecurity     Worry: Never true     Inability: Never true  Transportation needs     Medical: No     Non-medical: No   Tobacco Use    Smoking status: Never Smoker    Smokeless tobacco: Never Used   Substance and Sexual Activity    Alcohol use: No    Drug use: Never    Sexual activity: Not Currently     Partners: Female     Comment:    Lifestyle    Physical activity     Days per week: None     Minutes per session: None    Stress: None   Relationships    Social connections     Talks on phone: None     Gets together: None     Attends Mormonism service: None     Active member of club or organization: None     Attends meetings of clubs or organizations: None     Relationship status: None    Intimate partner violence     Fear of current or ex partner: None     Emotionally abused: None     Physically abused: None     Forced sexual activity: None   Other Topics Concern    None   Social History Narrative    None     Current Outpatient Medications   Medication Sig Dispense Refill    Compression Stockings MISC by Does not apply route Strength 30-40mmHg  Style: Other velcro compression stockings  Extremity: bilateral  Donning aid recommended: No 1 each 0    lisinopril (PRINIVIL;ZESTRIL) 10 MG tablet Take 1 tablet by mouth 2 times daily 90 tablet 1    furosemide (LASIX) 20 MG tablet Take 1 tablet by mouth daily FOR 10 DAYS 30 tablet 0    aspirin 81 MG EC tablet Take 81 mg by mouth daily.  Flaxseed, Linseed, (FLAX SEED OIL) 1000 MG CAPS Take  by mouth. No current facility-administered medications for this visit. Allergies   Allergen Reactions    Penicillins        Review of Systems   Constitutional: Negative. HENT: Negative. Eyes: Negative. Respiratory: Negative. Cardiovascular: Positive for leg swelling. Gastrointestinal: Negative. All other systems reviewed and are negative.       OBJECTIVE:  BP (!) 152/84 (Site: Left Upper Arm, Position: Sitting, Cuff Size: Small Adult)   Pulse 84   Temp 97 °F (36.1 °C) (Temporal)   Resp 14 Wt 225 lb 9.6 oz (102.3 kg)   SpO2 98%   BMI 31.46 kg/m²     Physical Exam  Vitals signs reviewed. Constitutional:       Appearance: Normal appearance. He is well-developed. Eyes:      General: No scleral icterus. Conjunctiva/sclera: Conjunctivae normal.   Neck:      Musculoskeletal: Normal range of motion and neck supple. Thyroid: No thyromegaly. Vascular: No carotid bruit or JVD. Cardiovascular:      Rate and Rhythm: Normal rate and regular rhythm. Heart sounds: Normal heart sounds. No murmur. Pulmonary:      Effort: Pulmonary effort is normal. No respiratory distress. Breath sounds: Normal breath sounds. No wheezing or rales. Chest:      Chest wall: No tenderness. Abdominal:      General: Bowel sounds are normal. There is no distension. Palpations: Abdomen is soft. There is no mass. Tenderness: There is no abdominal tenderness. There is no guarding or rebound. Musculoskeletal: Normal range of motion. General: No tenderness. Skin:     Findings: No rash. Neurological:      Mental Status: He is alert and oriented to person, place, and time. ASSESSMENT:     Diagnosis Orders   1. Essential hypertension  lisinopril (PRINIVIL;ZESTRIL) 20 MG tablet    Basic Metabolic Panel    CBC   2.  Edema leg  furosemide (LASIX) 20 MG tablet       PLAN:  See orders  Continue to monitor blood pressure at home  Skin care as he instructed  Close follow-up Treatment Number (Optional): 1